# Patient Record
Sex: MALE | Race: OTHER | HISPANIC OR LATINO | URBAN - METROPOLITAN AREA
[De-identification: names, ages, dates, MRNs, and addresses within clinical notes are randomized per-mention and may not be internally consistent; named-entity substitution may affect disease eponyms.]

---

## 2024-03-22 ENCOUNTER — INPATIENT (INPATIENT)
Facility: HOSPITAL | Age: 49
LOS: 3 days | Discharge: HOME | DRG: 603 | End: 2024-03-26
Attending: INTERNAL MEDICINE | Admitting: INTERNAL MEDICINE
Payer: COMMERCIAL

## 2024-03-22 VITALS
DIASTOLIC BLOOD PRESSURE: 77 MMHG | HEIGHT: 70 IN | HEART RATE: 82 BPM | TEMPERATURE: 98 F | OXYGEN SATURATION: 96 % | SYSTOLIC BLOOD PRESSURE: 122 MMHG | RESPIRATION RATE: 15 BRPM | WEIGHT: 199.96 LBS

## 2024-03-22 DIAGNOSIS — L03.115 CELLULITIS OF RIGHT LOWER LIMB: ICD-10-CM

## 2024-03-22 DIAGNOSIS — K58.9 IRRITABLE BOWEL SYNDROME WITHOUT DIARRHEA: ICD-10-CM

## 2024-03-22 DIAGNOSIS — Z29.9 ENCOUNTER FOR PROPHYLACTIC MEASURES, UNSPECIFIED: ICD-10-CM

## 2024-03-22 DIAGNOSIS — L03.119 CELLULITIS OF UNSPECIFIED PART OF LIMB: ICD-10-CM

## 2024-03-22 DIAGNOSIS — F17.200 NICOTINE DEPENDENCE, UNSPECIFIED, UNCOMPLICATED: ICD-10-CM

## 2024-03-22 DIAGNOSIS — L03.90 CELLULITIS, UNSPECIFIED: ICD-10-CM

## 2024-03-22 LAB
ALBUMIN SERPL ELPH-MCNC: 3.9 G/DL — SIGNIFICANT CHANGE UP (ref 3.3–5)
ALP SERPL-CCNC: 68 U/L — SIGNIFICANT CHANGE UP (ref 30–120)
ALT FLD-CCNC: 33 U/L — SIGNIFICANT CHANGE UP (ref 10–60)
ANION GAP SERPL CALC-SCNC: 10 MMOL/L — SIGNIFICANT CHANGE UP (ref 5–17)
AST SERPL-CCNC: 24 U/L — SIGNIFICANT CHANGE UP (ref 10–40)
BASOPHILS # BLD AUTO: 0.1 K/UL — SIGNIFICANT CHANGE UP (ref 0–0.2)
BASOPHILS NFR BLD AUTO: 0.5 % — SIGNIFICANT CHANGE UP (ref 0–2)
BILIRUB SERPL-MCNC: 0.4 MG/DL — SIGNIFICANT CHANGE UP (ref 0.2–1.2)
BUN SERPL-MCNC: 15 MG/DL — SIGNIFICANT CHANGE UP (ref 7–23)
CALCIUM SERPL-MCNC: 9.7 MG/DL — SIGNIFICANT CHANGE UP (ref 8.4–10.5)
CHLORIDE SERPL-SCNC: 102 MMOL/L — SIGNIFICANT CHANGE UP (ref 96–108)
CO2 SERPL-SCNC: 26 MMOL/L — SIGNIFICANT CHANGE UP (ref 22–31)
CREAT SERPL-MCNC: 1.18 MG/DL — SIGNIFICANT CHANGE UP (ref 0.5–1.3)
EGFR: 77 ML/MIN/1.73M2 — SIGNIFICANT CHANGE UP
EOSINOPHIL # BLD AUTO: 0.44 K/UL — SIGNIFICANT CHANGE UP (ref 0–0.5)
EOSINOPHIL NFR BLD AUTO: 2.4 % — SIGNIFICANT CHANGE UP (ref 0–6)
ERYTHROCYTE [SEDIMENTATION RATE] IN BLOOD: 28 MM/HR — HIGH (ref 0–9)
GLUCOSE SERPL-MCNC: 113 MG/DL — HIGH (ref 70–99)
HCT VFR BLD CALC: 42.9 % — SIGNIFICANT CHANGE UP (ref 39–50)
HGB BLD-MCNC: 14.8 G/DL — SIGNIFICANT CHANGE UP (ref 13–17)
HIV 1 & 2 AB SERPL IA.RAPID: SIGNIFICANT CHANGE UP
IMM GRANULOCYTES NFR BLD AUTO: 0.8 % — SIGNIFICANT CHANGE UP (ref 0–0.9)
LACTATE SERPL-SCNC: 0.7 MMOL/L — SIGNIFICANT CHANGE UP (ref 0.7–2)
LYMPHOCYTES # BLD AUTO: 12.8 % — LOW (ref 13–44)
LYMPHOCYTES # BLD AUTO: 2.39 K/UL — SIGNIFICANT CHANGE UP (ref 1–3.3)
MCHC RBC-ENTMCNC: 31 PG — SIGNIFICANT CHANGE UP (ref 27–34)
MCHC RBC-ENTMCNC: 34.5 GM/DL — SIGNIFICANT CHANGE UP (ref 32–36)
MCV RBC AUTO: 89.9 FL — SIGNIFICANT CHANGE UP (ref 80–100)
MONOCYTES # BLD AUTO: 1.24 K/UL — HIGH (ref 0–0.9)
MONOCYTES NFR BLD AUTO: 6.7 % — SIGNIFICANT CHANGE UP (ref 2–14)
NEUTROPHILS # BLD AUTO: 14.31 K/UL — HIGH (ref 1.8–7.4)
NEUTROPHILS NFR BLD AUTO: 76.8 % — SIGNIFICANT CHANGE UP (ref 43–77)
NRBC # BLD: 0 /100 WBCS — SIGNIFICANT CHANGE UP (ref 0–0)
PLATELET # BLD AUTO: 430 K/UL — HIGH (ref 150–400)
POTASSIUM SERPL-MCNC: 4.2 MMOL/L — SIGNIFICANT CHANGE UP (ref 3.5–5.3)
POTASSIUM SERPL-SCNC: 4.2 MMOL/L — SIGNIFICANT CHANGE UP (ref 3.5–5.3)
PROT SERPL-MCNC: 8 G/DL — SIGNIFICANT CHANGE UP (ref 6–8.3)
RBC # BLD: 4.77 M/UL — SIGNIFICANT CHANGE UP (ref 4.2–5.8)
RBC # FLD: 12.6 % — SIGNIFICANT CHANGE UP (ref 10.3–14.5)
SODIUM SERPL-SCNC: 138 MMOL/L — SIGNIFICANT CHANGE UP (ref 135–145)
WBC # BLD: 18.62 K/UL — HIGH (ref 3.8–10.5)
WBC # FLD AUTO: 18.62 K/UL — HIGH (ref 3.8–10.5)

## 2024-03-22 PROCEDURE — 99285 EMERGENCY DEPT VISIT HI MDM: CPT

## 2024-03-22 PROCEDURE — 93010 ELECTROCARDIOGRAM REPORT: CPT

## 2024-03-22 PROCEDURE — 71045 X-RAY EXAM CHEST 1 VIEW: CPT | Mod: 26

## 2024-03-22 RX ORDER — LANOLIN ALCOHOL/MO/W.PET/CERES
3 CREAM (GRAM) TOPICAL AT BEDTIME
Refills: 0 | Status: DISCONTINUED | OUTPATIENT
Start: 2024-03-22 | End: 2024-03-26

## 2024-03-22 RX ORDER — DICLOFENAC SODIUM 75 MG/1
1 TABLET, DELAYED RELEASE ORAL
Refills: 0 | DISCHARGE

## 2024-03-22 RX ORDER — PIPERACILLIN AND TAZOBACTAM 4; .5 G/20ML; G/20ML
3.38 INJECTION, POWDER, LYOPHILIZED, FOR SOLUTION INTRAVENOUS ONCE
Refills: 0 | Status: COMPLETED | OUTPATIENT
Start: 2024-03-22 | End: 2024-03-22

## 2024-03-22 RX ORDER — ONDANSETRON 8 MG/1
4 TABLET, FILM COATED ORAL EVERY 8 HOURS
Refills: 0 | Status: DISCONTINUED | OUTPATIENT
Start: 2024-03-22 | End: 2024-03-26

## 2024-03-22 RX ORDER — ACETAMINOPHEN 500 MG
650 TABLET ORAL EVERY 6 HOURS
Refills: 0 | Status: DISCONTINUED | OUTPATIENT
Start: 2024-03-22 | End: 2024-03-26

## 2024-03-22 RX ORDER — DAPTOMYCIN 500 MG/10ML
500 INJECTION, POWDER, LYOPHILIZED, FOR SOLUTION INTRAVENOUS EVERY 24 HOURS
Refills: 0 | Status: DISCONTINUED | OUTPATIENT
Start: 2024-03-22 | End: 2024-03-26

## 2024-03-22 RX ORDER — PANTOPRAZOLE SODIUM 20 MG/1
40 TABLET, DELAYED RELEASE ORAL
Refills: 0 | Status: DISCONTINUED | OUTPATIENT
Start: 2024-03-22 | End: 2024-03-26

## 2024-03-22 RX ORDER — SODIUM CHLORIDE 9 MG/ML
1000 INJECTION INTRAMUSCULAR; INTRAVENOUS; SUBCUTANEOUS
Refills: 0 | Status: DISCONTINUED | OUTPATIENT
Start: 2024-03-22 | End: 2024-03-25

## 2024-03-22 RX ORDER — CEFTRIAXONE 500 MG/1
2000 INJECTION, POWDER, FOR SOLUTION INTRAMUSCULAR; INTRAVENOUS EVERY 24 HOURS
Refills: 0 | Status: DISCONTINUED | OUTPATIENT
Start: 2024-03-22 | End: 2024-03-25

## 2024-03-22 RX ORDER — MAGNESIUM HYDROXIDE 400 MG/1
30 TABLET, CHEWABLE ORAL DAILY
Refills: 0 | Status: DISCONTINUED | OUTPATIENT
Start: 2024-03-22 | End: 2024-03-26

## 2024-03-22 RX ORDER — VANCOMYCIN HCL 1 G
1000 VIAL (EA) INTRAVENOUS ONCE
Refills: 0 | Status: COMPLETED | OUTPATIENT
Start: 2024-03-22 | End: 2024-03-22

## 2024-03-22 RX ORDER — LACTOBACILLUS ACIDOPHILUS 100MM CELL
1 CAPSULE ORAL EVERY 12 HOURS
Refills: 0 | Status: DISCONTINUED | OUTPATIENT
Start: 2024-03-22 | End: 2024-03-26

## 2024-03-22 RX ORDER — ENOXAPARIN SODIUM 100 MG/ML
40 INJECTION SUBCUTANEOUS EVERY 24 HOURS
Refills: 0 | Status: DISCONTINUED | OUTPATIENT
Start: 2024-03-23 | End: 2024-03-26

## 2024-03-22 RX ORDER — TRAMADOL HYDROCHLORIDE 50 MG/1
50 TABLET ORAL THREE TIMES A DAY
Refills: 0 | Status: DISCONTINUED | OUTPATIENT
Start: 2024-03-22 | End: 2024-03-26

## 2024-03-22 RX ORDER — PIPERACILLIN AND TAZOBACTAM 4; .5 G/20ML; G/20ML
3.38 INJECTION, POWDER, LYOPHILIZED, FOR SOLUTION INTRAVENOUS EVERY 8 HOURS
Refills: 0 | Status: DISCONTINUED | OUTPATIENT
Start: 2024-03-22 | End: 2024-03-22

## 2024-03-22 RX ORDER — SODIUM CHLORIDE 9 MG/ML
1000 INJECTION INTRAMUSCULAR; INTRAVENOUS; SUBCUTANEOUS ONCE
Refills: 0 | Status: COMPLETED | OUTPATIENT
Start: 2024-03-22 | End: 2024-03-22

## 2024-03-22 RX ORDER — DICLOFENAC SODIUM 75 MG/1
75 TABLET, DELAYED RELEASE ORAL
Refills: 0 | Status: DISCONTINUED | OUTPATIENT
Start: 2024-03-22 | End: 2024-03-26

## 2024-03-22 RX ADMIN — SODIUM CHLORIDE 1000 MILLILITER(S): 9 INJECTION INTRAMUSCULAR; INTRAVENOUS; SUBCUTANEOUS at 14:17

## 2024-03-22 RX ADMIN — PIPERACILLIN AND TAZOBACTAM 3.38 GRAM(S): 4; .5 INJECTION, POWDER, LYOPHILIZED, FOR SOLUTION INTRAVENOUS at 13:47

## 2024-03-22 RX ADMIN — DAPTOMYCIN 120 MILLIGRAM(S): 500 INJECTION, POWDER, LYOPHILIZED, FOR SOLUTION INTRAVENOUS at 19:48

## 2024-03-22 RX ADMIN — TRAMADOL HYDROCHLORIDE 50 MILLIGRAM(S): 50 TABLET ORAL at 23:41

## 2024-03-22 RX ADMIN — SODIUM CHLORIDE 1000 MILLILITER(S): 9 INJECTION INTRAMUSCULAR; INTRAVENOUS; SUBCUTANEOUS at 13:17

## 2024-03-22 RX ADMIN — SODIUM CHLORIDE 60 MILLILITER(S): 9 INJECTION INTRAMUSCULAR; INTRAVENOUS; SUBCUTANEOUS at 18:44

## 2024-03-22 RX ADMIN — CEFTRIAXONE 100 MILLIGRAM(S): 500 INJECTION, POWDER, FOR SOLUTION INTRAMUSCULAR; INTRAVENOUS at 18:50

## 2024-03-22 RX ADMIN — Medication 1 TABLET(S): at 17:30

## 2024-03-22 RX ADMIN — Medication 3 MILLIGRAM(S): at 23:05

## 2024-03-22 RX ADMIN — TRAMADOL HYDROCHLORIDE 50 MILLIGRAM(S): 50 TABLET ORAL at 23:05

## 2024-03-22 RX ADMIN — PIPERACILLIN AND TAZOBACTAM 200 GRAM(S): 4; .5 INJECTION, POWDER, LYOPHILIZED, FOR SOLUTION INTRAVENOUS at 13:17

## 2024-03-22 RX ADMIN — Medication 250 MILLIGRAM(S): at 14:26

## 2024-03-22 NOTE — CONSULT NOTE ADULT - SUBJECTIVE AND OBJECTIVE BOX
HPI:  47YO M no significant past medical history who presented to the hospital with c/o bilateral LE redness pain and swelling  x 10 days.  Patient states that he has history of folliculitis. Was seen at urgent care on 8/18 and started on Bactrim without improvement. Denies fever, chills, nausea, vomiting, cp, sob or other symptoms. In ED noted to have Primo LE abscess with cellulitis. Sp I&D in ED.    Infectious Disease consult was called to evaluate pt and for antibiotic management.      Past Medical & Surgical Hx:  PAST MEDICAL & SURGICAL HISTORY:  IBS (irritable bowel syndrome)  Nicotine use disorder    Social History--  EtOH: denies   Tobacco: denies  Drug Use: denies      FAMILY HISTORY:  Noncontributory    Allergies  No Known Allergies    Intolerances  NONE      Home Medications:      Current Inpatient Medications :    ANTIBIOTICS:   piperacillin/tazobactam IVPB.. 3.375 Gram(s) IV Intermittent every 8 hours      OTHER RELEVANT MEDICATIONS :  acetaminophen     Tablet .. 650 milliGRAM(s) Oral every 6 hours PRN  aluminum hydroxide/magnesium hydroxide/simethicone Suspension 30 milliLiter(s) Oral every 4 hours PRN  magnesium hydroxide Suspension 30 milliLiter(s) Oral daily PRN  melatonin 3 milliGRAM(s) Oral at bedtime PRN  ondansetron Injectable 4 milliGRAM(s) IV Push every 8 hours PRN  pantoprazole    Tablet 40 milliGRAM(s) Oral before breakfast  sodium chloride 0.9%. 1000 milliLiter(s) IV Continuous <Continuous>  traMADol 50 milliGRAM(s) Oral three times a day PRN        ROS:  CONSTITUTIONAL:  Negative fever or chills  EYES:  Negative  blurry vision or double vision  CARDIOVASCULAR:  Negative for chest pain or palpitations  RESPIRATORY:  Negative for cough, wheezing, or SOB   GASTROINTESTINAL:  Negative for nausea, vomiting, diarrhea, constipation, or abdominal pain  GENITOURINARY:  Negative frequency, urgency , dysuria or hematuria   NEUROLOGIC:  No headache, confusion, dizziness, lightheadedness  All other systems were reviewed and are negative      Physical Exam:  Vital Signs Last 24 Hrs  T(C): 36.8 (22 Mar 2024 12:29), Max: 36.8 (22 Mar 2024 12:29)  T(F): 98.3 (22 Mar 2024 12:29), Max: 98.3 (22 Mar 2024 12:29)  HR: 82 (22 Mar 2024 12:29) (82 - 82)  BP: 122/77 (22 Mar 2024 12:29) (122/77 - 122/77)  RR: 15 (22 Mar 2024 12:29) (15 - 15)  SpO2: 96% (22 Mar 2024 12:29) (96% - 96%)    Parameters below as of 22 Mar 2024 12:29  Patient On (Oxygen Delivery Method): room air      Height (cm): 177.8 (03-22 @ 12:29)  Weight (kg): 90.7 (03-22 @ 12:29)  BMI (kg/m2): 28.7 (03-22 @ 12:29)  BSA (m2): 2.09 (03-22 @ 12:29)    General: well developed well nourished, in no acute distress  Neck: supple, trachea midline  Lungs: clear, no wheeze/rhonchi  Cardiovascular: regular rate and rhythm, S1 S2  Abdomen: soft, nontender, ND, bowel sounds normal  Neurological:  alert and oriented x3  Skin: no rash  Extremities: Primo LE erythema with induration +tender to touch      Labs:                      14.8   18.62 )-----------( 430      ( 22 Mar 2024 13:20 )             42.9   03-22    138  |  102  |  15  ----------------------------<  113<H>  4.2   |  26  |  1.18    Ca    9.7      22 Mar 2024 13:20    TPro  8.0  /  Alb  3.9  /  TBili  0.4  /  DBili  x   /  AST  24  /  ALT  33  /  AlkPhos  68  03-22      RECENT CULTURES:          RADIOLOGY & ADDITIONAL STUDIES:    Assessment :   47YO M no significant past medical history who presented to the hospital with c/o bilateral LE redness pain and swelling  x 10 days.  Patient states that he has history of folliculitis. Was seen at urgent care on 8/18 and started on Bactrim without improvement. Denies fever, chills, nausea, vomiting, cp, sob or other symptoms. In ED noted to have Primo LE abscess with cellulitis. wbc 18K  Sp I&D in ED. unfortunately no cultures sent    Plan :   Change to Dapto and Rocephin  Get wound cultures  Fu cultures  MRSA screen  Elevate legs    Continue with present regiment .  Approptiate use of antibiotics and adverse effects reviewed.      I have discussed the above plan of care with patient in detail. He expressed understanding of the treatment plan . Risks, benefits and alternatives discussed in detail. I have asked if he has any questions or concerns and appropriately addressed them to the best of my ability .      > 45 minutes spent in direct patient care reviewing  the notes, lab data/ imaging , discussion with multidisciplinary team. All questions were addressed and answered to the best of my capacity .    Thank you for allowing me to participate in the care of your patient .      Dusty Hammer MD  Infectious Disease  [phone]
47 yo male c/o bilat lower extremities cellulitis x last 10 days, pain , swelling  after possible mosquito bites, while in [place]. Patient started on bactrim 4 days ago without any improvement. Pain 8/10, not relived by anything.    "date of service"03-22-24 @ 16:24    HPI:      PAST MEDICAL & SURGICAL HISTORY:      03-22-24 @ 16:24  REVIEW OF SYSTEMS:    CONSTITUTIONAL: No weakness, fevers or chills  EYES/ENT: No visual changes;  No vertigo or throat pain   NECK: No pain or stiffness  RESPIRATORY: No cough, wheezing, hemoptysis; No shortness of breath  CARDIOVASCULAR: No chest pain or palpitations  GASTROINTESTINAL: No abdominal or epigastric pain. No nausea, vomiting, or hematemesis; No diarrhea or constipation. No melena or hematochezia.  GENITOURINARY: No dysuria, frequency or hematuria  NEUROLOGICAL: No numbness or weakness  SKIN: No itching, burning, rashes, or lesions   All other review of systems is negative unless indicated above.    MEDICATIONS  (STANDING):    MEDICATIONS  (PRN):      Allergies    No Known Allergies    Intolerances        SOCIAL HISTORY: non smoker    FAMILY HISTORY:non contributory      Vital Signs Last 24 Hrs  T(C): 36.8 (22 Mar 2024 12:29), Max: 36.8 (22 Mar 2024 12:29)  T(F): 98.3 (22 Mar 2024 12:29), Max: 98.3 (22 Mar 2024 12:29)  HR: 82 (22 Mar 2024 12:29) (82 - 82)  BP: 122/77 (22 Mar 2024 12:29) (122/77 - 122/77)  BP(mean): --  RR: 15 (22 Mar 2024 12:29) (15 - 15)  SpO2: 96% (22 Mar 2024 12:29) (96% - 96%)    Parameters below as of 22 Mar 2024 12:29  Patient On (Oxygen Delivery Method): room air        .03-22-24 @ 16:24  VITAL SIGNS:  T(C): 36.8 (03-22-24 @ 12:29), Max: 36.8 (03-22-24 @ 12:29)  T(F): 98.3 (03-22-24 @ 12:29), Max: 98.3 (03-22-24 @ 12:29)  HR: 82 (03-22-24 @ 12:29) (82 - 82)  BP: 122/77 (03-22-24 @ 12:29) (122/77 - 122/77)  BP(mean): --  RR: 15 (03-22-24 @ 12:29) (15 - 15)  SpO2: 96% (03-22-24 @ 12:29) (96% - 96%)  Wt(kg): --    PHYSICAL EXAM:    Constitutional: resting comfortably in bed; NAD  Eyes: PERRL, EOMI, anicteric sclera  ENT: no nasal discharge; uvula midline, no oropharyngeal erythema or exudates  Neck: supple; no JVD or thyromegaly  Respiratory: CTA B/L; no W/R/R, no retractions  Cardiac: +S1/S2; RRR; no murmurs  Gastrointestinal: soft, NT/ND; no rebound or guarding; +BSx4  Back: spine midline, no bony tenderness or step-offs; no CVAT B/L  Extremities: Bilat LE cellulitis with fluctuance seen in left lowe4 leg, proximal third, lateral aspect 3cm fluctuance with 10 cm cellulitis, RT lower ext 2 cm fluctuance medial leg, few other possible bites? allergic reaction?   Musculoskeletal: NROM x4; no joint swelling, tenderness or erythema  Vascular: 2+ radial, femoral, DP/PT pulses B/L  Dermatologic: skin warm, dry and intact; no rashes, wounds, or scars  Lymphatic: no submandibular or cervical LAD  Neurologic: AAOx3; CNII-XII grossly intact; no focal deficits  Psychiatric: affect and characteristics of appearance, verbalizations, behaviors are appropriate    LABS:                        14.8   18.62 )-----------( 430      ( 22 Mar 2024 13:20 )             42.9       03-22    138  |  102  |  15  ----------------------------<  113<H>  4.2   |  26  |  1.18    Ca    9.7      22 Mar 2024 13:20    TPro  8.0  /  Alb  3.9  /  TBili  0.4  /  DBili  x   /  AST  24  /  ALT  33  /  AlkPhos  68  03-22          Urinalysis Basic - ( 22 Mar 2024 13:20 )    Color: x / Appearance: x / SG: x / pH: x  Gluc: 113 mg/dL / Ketone: x  / Bili: x / Urobili: x   Blood: x / Protein: x / Nitrite: x   Leuk Esterase: x / RBC: x / WBC x   Sq Epi: x / Non Sq Epi: x / Bacteria: x        RADIOLOGY & ADDITIONAL STUDIES:

## 2024-03-22 NOTE — ED PROVIDER NOTE - SKIN, MLM
+erythema with increased warmth and mild swelling noted to B anterior lower legs with abscess noted to right medial and left lateral lower legs

## 2024-03-22 NOTE — H&P ADULT - ASSESSMENT
48-year-old male with no significant past medical history presents with complaint of redness to bilateral lower legs x 10 days.  Patient states that he has history of folliculitis and noticed rash/?insect bites while in [place] to his bilateral lower legs 10 days ago however states that his symptoms have been getting worse with surrounding redness and occasional weeping.  States that he was seen at urgent care on 8/18 and started on Bactrim without improvement.  States that he returned to urgent care today and was sent to ER for IV antibiotics.  Denies fever, chills, nausea, vomiting, cp, sob or other symptoms.  pcp: Dr. Harrison Scott

## 2024-03-22 NOTE — ED PROVIDER NOTE - OBJECTIVE STATEMENT
48-year-old male with no significant past medical history presents with complaint of redness to bilateral lower legs x 10 days.  Patient states that he has history of folliculitis and noticed rash to his bilateral lower legs 10 days ago however states that has been getting worse with surrounding redness and occasional weeping.  States that he was seen at urgent care on 8/18 and started on Bactrim without improvement.  States that he returned to urgent care today and was sent to ER for IV antibiotics.  Denies fever, chills, nausea, vomiting, cp, sob or other symptoms. 48-year-old male with no significant past medical history presents with complaint of redness to bilateral lower legs x 10 days.  Patient states that he has history of folliculitis and noticed rash/?insect bites while in [place] to his bilateral lower legs 10 days ago however states that his symptoms have been getting worse with surrounding redness and occasional weeping.  States that he was seen at urgent care on 8/18 and started on Bactrim without improvement.  States that he returned to urgent care today and was sent to ER for IV antibiotics.  Denies fever, chills, nausea, vomiting, cp, sob or other symptoms.  pcp: Dr. Harrison Scott

## 2024-03-22 NOTE — H&P ADULT - SKIN COMMENTS
Bilat LE cellulitis with fluctuance seen in left lowe4 leg, proximal third, lateral aspect 3cm fluctuance with 10 cm cellulitis, RT lower ext 2 cm fluctuance medial leg, few other possible bites? allergic reaction?

## 2024-03-22 NOTE — ED PROVIDER NOTE - CARE PLAN
Principal Discharge DX:	Bilateral cellulitis of lower leg  Secondary Diagnosis:	Abscess of left lower leg   1

## 2024-03-22 NOTE — ED ADULT NURSE NOTE - OBJECTIVE STATEMENT
Pt is alert and oriented. Pt states that he has had redness and swelling in his BLE for 10 days. Pt states that he was put on abx on Monday with no relief. Pt has redness and swelling to his ble. Pt has a large abscess to the left outer calf. Pt denies fevers, sob, chest pain, nausea, vomiting, and dizziness. Pt resp are even and unlabored, skin color rebekah for race. Pt updated on plan of care.

## 2024-03-22 NOTE — ED PROVIDER NOTE - MUSCULOSKELETAL, MLM
+erythema with increased warmth and mild swelling noted to B anterior lower legs with induration noted to right medial and left lateral lower legs, no fluctuance or drainage noted, toes warm & mobile, distal pulses and sensation intact, FROM BLE, calf B NT, NVI

## 2024-03-22 NOTE — CONSULT NOTE ADULT - ASSESSMENT
47 yo male with bilat LE cellulitis/abscess  -Will drain abscess at bedside  -It looks like a severe allergic reaction? to mosquitoe bites? denies poison ivy contact  -Keep leg elevated  -IV Abx

## 2024-03-22 NOTE — ED PROVIDER NOTE - CLINICAL SUMMARY MEDICAL DECISION MAKING FREE TEXT BOX
48-year-old male with no significant past medical history presents with complaint of redness to bilateral lower legs x 10 days.  Patient states that he has history of folliculitis and noticed rash to his bilateral lower legs 10 days ago however states that has been getting worse with surrounding redness and occasional weeping.  States that he was seen at urgent care on 8/18 and started on Bactrim without improvement.  States that he returned to urgent care today and was sent to ER for IV antibiotics.  Denies fever, chills, nausea, vomiting, cp, sob or other symptoms.    VSS Afebrile, NAD  HEENT - clear  PERRL EOMI  Neck supple  lungs clear  Cor S1S2 RR - MGR  Abd soft nontender, no mass or HSM, no rebound  Ext FROM intact, no edema  Neuro Intact, no deficits.  Skin +erythema with increased warmth and mild swelling noted to B anterior lower legs with abscess noted to right medial and left lateral lower legs    Imp- Cellulitis  Plan - labs, IV antibiotics, admit. 48-year-old male with no significant past medical history presents with complaint of redness to bilateral lower legs x 10 days.  Patient states that he has history of folliculitis and noticed rash to his bilateral lower legs 10 days ago however states that has been getting worse with surrounding redness and occasional weeping.  States that he was seen at urgent care on 8/18 and started on Bactrim without improvement.  States that he returned to urgent care today and was sent to ER for IV antibiotics.  Denies fever, chills, nausea, vomiting, cp, sob or other symptoms.    VSS Afebrile, NAD  HEENT - clear  PERRL EOMI  Neck supple  lungs clear  Cor S1S2 RR - MGR  Abd soft nontender, no mass or HSM, no rebound  Ext FROM intact  Neuro Intact, no deficits.  Skin +erythema with increased warmth and mild swelling noted to B anterior lower legs with abscess noted to right medial and left lateral lower legs    Imp- Cellulitis  Plan - labs, IV antibiotics, admit.

## 2024-03-22 NOTE — ED ADULT TRIAGE NOTE - CHIEF COMPLAINT QUOTE
49 y/o male presents axo4 ambulatory referred to the ED from urgent care for bilateral lower extremity cellulitis eval/treatment. pt reports hx folliculitis and has been on PO antibiotics over the past week with no relief.

## 2024-03-22 NOTE — H&P ADULT - PROBLEM SELECTOR PLAN 1
likely  2/2 to  severe allergic reaction to mosquitoes bites ,denies poison ivy contact  ,seen by ID CONS - start  Dapto and Rocephin , s/p debridement  ,surgery is following   Get wound cultures  Fu cultures  MRSA screen  Elevate legs , topical care , daily shower and dry dressing

## 2024-03-22 NOTE — H&P ADULT - TIME BILLING
55minutes spent on this visit, 50% visit time spent in care co-ordination with other attendings and counselling patient ,writing admission orders ( see complete and current orders and order section) ,requesting necessary consults ,informing family about status & plan of care .I have discussed care plan with [facility] wellness/admitting /nursing   department ,outpatient PCP , hospital consultants , ER physician & med staff .

## 2024-03-23 LAB
ALBUMIN SERPL ELPH-MCNC: 3.2 G/DL — LOW (ref 3.3–5)
ALP SERPL-CCNC: 61 U/L — SIGNIFICANT CHANGE UP (ref 30–120)
ALT FLD-CCNC: 27 U/L — SIGNIFICANT CHANGE UP (ref 10–60)
ANION GAP SERPL CALC-SCNC: 8 MMOL/L — SIGNIFICANT CHANGE UP (ref 5–17)
AST SERPL-CCNC: 32 U/L — SIGNIFICANT CHANGE UP (ref 10–40)
BASOPHILS # BLD AUTO: 0.12 K/UL — SIGNIFICANT CHANGE UP (ref 0–0.2)
BASOPHILS NFR BLD AUTO: 0.8 % — SIGNIFICANT CHANGE UP (ref 0–2)
BILIRUB SERPL-MCNC: 0.6 MG/DL — SIGNIFICANT CHANGE UP (ref 0.2–1.2)
BUN SERPL-MCNC: 11 MG/DL — SIGNIFICANT CHANGE UP (ref 7–23)
CALCIUM SERPL-MCNC: 8.9 MG/DL — SIGNIFICANT CHANGE UP (ref 8.4–10.5)
CHLORIDE SERPL-SCNC: 107 MMOL/L — SIGNIFICANT CHANGE UP (ref 96–108)
CO2 SERPL-SCNC: 25 MMOL/L — SIGNIFICANT CHANGE UP (ref 22–31)
CREAT SERPL-MCNC: 0.91 MG/DL — SIGNIFICANT CHANGE UP (ref 0.5–1.3)
EGFR: 105 ML/MIN/1.73M2 — SIGNIFICANT CHANGE UP
EOSINOPHIL # BLD AUTO: 0.87 K/UL — HIGH (ref 0–0.5)
EOSINOPHIL NFR BLD AUTO: 5.6 % — SIGNIFICANT CHANGE UP (ref 0–6)
GLUCOSE SERPL-MCNC: 117 MG/DL — HIGH (ref 70–99)
HCT VFR BLD CALC: 41.7 % — SIGNIFICANT CHANGE UP (ref 39–50)
HGB BLD-MCNC: 13.8 G/DL — SIGNIFICANT CHANGE UP (ref 13–17)
IMM GRANULOCYTES NFR BLD AUTO: 0.8 % — SIGNIFICANT CHANGE UP (ref 0–0.9)
INR BLD: 1.07 RATIO — SIGNIFICANT CHANGE UP (ref 0.85–1.18)
LYMPHOCYTES # BLD AUTO: 21.9 % — SIGNIFICANT CHANGE UP (ref 13–44)
LYMPHOCYTES # BLD AUTO: 3.4 K/UL — HIGH (ref 1–3.3)
MCHC RBC-ENTMCNC: 30.4 PG — SIGNIFICANT CHANGE UP (ref 27–34)
MCHC RBC-ENTMCNC: 33.1 GM/DL — SIGNIFICANT CHANGE UP (ref 32–36)
MCV RBC AUTO: 91.9 FL — SIGNIFICANT CHANGE UP (ref 80–100)
MONOCYTES # BLD AUTO: 1.47 K/UL — HIGH (ref 0–0.9)
MONOCYTES NFR BLD AUTO: 9.5 % — SIGNIFICANT CHANGE UP (ref 2–14)
MRSA PCR RESULT.: SIGNIFICANT CHANGE UP
NEUTROPHILS # BLD AUTO: 9.52 K/UL — HIGH (ref 1.8–7.4)
NEUTROPHILS NFR BLD AUTO: 61.4 % — SIGNIFICANT CHANGE UP (ref 43–77)
NRBC # BLD: 0 /100 WBCS — SIGNIFICANT CHANGE UP (ref 0–0)
PLATELET # BLD AUTO: 399 K/UL — SIGNIFICANT CHANGE UP (ref 150–400)
POTASSIUM SERPL-MCNC: 4.9 MMOL/L — SIGNIFICANT CHANGE UP (ref 3.5–5.3)
POTASSIUM SERPL-SCNC: 4.9 MMOL/L — SIGNIFICANT CHANGE UP (ref 3.5–5.3)
PROT SERPL-MCNC: 7 G/DL — SIGNIFICANT CHANGE UP (ref 6–8.3)
PROTHROM AB SERPL-ACNC: 11.6 SEC — SIGNIFICANT CHANGE UP (ref 9.5–13)
RBC # BLD: 4.54 M/UL — SIGNIFICANT CHANGE UP (ref 4.2–5.8)
RBC # FLD: 12.8 % — SIGNIFICANT CHANGE UP (ref 10.3–14.5)
S AUREUS DNA NOSE QL NAA+PROBE: SIGNIFICANT CHANGE UP
SODIUM SERPL-SCNC: 140 MMOL/L — SIGNIFICANT CHANGE UP (ref 135–145)
WBC # BLD: 15.5 K/UL — HIGH (ref 3.8–10.5)
WBC # FLD AUTO: 15.5 K/UL — HIGH (ref 3.8–10.5)

## 2024-03-23 RX ADMIN — DICLOFENAC SODIUM 75 MILLIGRAM(S): 75 TABLET, DELAYED RELEASE ORAL at 06:15

## 2024-03-23 RX ADMIN — ENOXAPARIN SODIUM 40 MILLIGRAM(S): 100 INJECTION SUBCUTANEOUS at 08:21

## 2024-03-23 RX ADMIN — TRAMADOL HYDROCHLORIDE 50 MILLIGRAM(S): 50 TABLET ORAL at 08:21

## 2024-03-23 RX ADMIN — PANTOPRAZOLE SODIUM 40 MILLIGRAM(S): 20 TABLET, DELAYED RELEASE ORAL at 06:15

## 2024-03-23 RX ADMIN — Medication 1 TABLET(S): at 17:02

## 2024-03-23 RX ADMIN — Medication 650 MILLIGRAM(S): at 20:25

## 2024-03-23 RX ADMIN — CEFTRIAXONE 100 MILLIGRAM(S): 500 INJECTION, POWDER, FOR SOLUTION INTRAMUSCULAR; INTRAVENOUS at 17:01

## 2024-03-23 RX ADMIN — Medication 650 MILLIGRAM(S): at 12:51

## 2024-03-23 RX ADMIN — SODIUM CHLORIDE 60 MILLILITER(S): 9 INJECTION INTRAMUSCULAR; INTRAVENOUS; SUBCUTANEOUS at 08:21

## 2024-03-23 RX ADMIN — DAPTOMYCIN 120 MILLIGRAM(S): 500 INJECTION, POWDER, LYOPHILIZED, FOR SOLUTION INTRAVENOUS at 18:21

## 2024-03-23 RX ADMIN — Medication 650 MILLIGRAM(S): at 13:20

## 2024-03-23 RX ADMIN — Medication 3 MILLIGRAM(S): at 20:25

## 2024-03-23 RX ADMIN — DICLOFENAC SODIUM 75 MILLIGRAM(S): 75 TABLET, DELAYED RELEASE ORAL at 06:45

## 2024-03-23 RX ADMIN — Medication 650 MILLIGRAM(S): at 20:55

## 2024-03-23 RX ADMIN — DICLOFENAC SODIUM 75 MILLIGRAM(S): 75 TABLET, DELAYED RELEASE ORAL at 17:01

## 2024-03-23 RX ADMIN — TRAMADOL HYDROCHLORIDE 50 MILLIGRAM(S): 50 TABLET ORAL at 17:01

## 2024-03-23 RX ADMIN — TRAMADOL HYDROCHLORIDE 50 MILLIGRAM(S): 50 TABLET ORAL at 17:30

## 2024-03-23 RX ADMIN — Medication 1 TABLET(S): at 06:15

## 2024-03-23 RX ADMIN — TRAMADOL HYDROCHLORIDE 50 MILLIGRAM(S): 50 TABLET ORAL at 08:50

## 2024-03-23 RX ADMIN — DICLOFENAC SODIUM 75 MILLIGRAM(S): 75 TABLET, DELAYED RELEASE ORAL at 17:30

## 2024-03-23 NOTE — PROGRESS NOTE ADULT - SUBJECTIVE AND OBJECTIVE BOX
CHIEF COMPLAINT/ REASON FOR VISIT  .. Patient was seen to address the  issue listed under PROBLEM LIST which is located toward bottom of this note     ELLIOT DAWSON    SY 1EST [address]    Allergies    No Known Allergies    Intolerances        PAST MEDICAL & SURGICAL HISTORY:  IBS (irritable bowel syndrome)      Nicotine use disorder          FAMILY HISTORY:      Home Medications:  diclofenac sodium 75 mg oral delayed release tablet: 1 tab(s) orally 2 times a day (22 Mar 2024 19:01)      MEDICATIONS  (STANDING):  cefTRIAXone   IVPB 2000 milliGRAM(s) IV Intermittent every 24 hours  DAPTOmycin IVPB 500 milliGRAM(s) IV Intermittent every 24 hours  diclofenac 75 milliGRAM(s) Oral two times a day  enoxaparin Injectable 40 milliGRAM(s) SubCutaneous every 24 hours  lactobacillus acidophilus 1 Tablet(s) Oral every 12 hours  pantoprazole    Tablet 40 milliGRAM(s) Oral before breakfast  sodium chloride 0.9%. 1000 milliLiter(s) (60 mL/Hr) IV Continuous <Continuous>    MEDICATIONS  (PRN):  acetaminophen     Tablet .. 650 milliGRAM(s) Oral every 6 hours PRN Temp greater or equal to 38C (100.4F), Mild Pain (1 - 3)  aluminum hydroxide/magnesium hydroxide/simethicone Suspension 30 milliLiter(s) Oral every 4 hours PRN Dyspepsia  magnesium hydroxide Suspension 30 milliLiter(s) Oral daily PRN Constipation  melatonin 3 milliGRAM(s) Oral at bedtime PRN Insomnia  ondansetron Injectable 4 milliGRAM(s) IV Push every 8 hours PRN Nausea and/or Vomiting  traMADol 50 milliGRAM(s) Oral three times a day PRN Moderate Pain (4 - 6)      Diet, Regular:   Lactose Restricted (Milk Sugar Intoler.) (03-22-24 @ 16:54) [Active]          Vital Signs Last 24 Hrs  T(C): 36.7 (23 Mar 2024 05:17), Max: 36.8 (22 Mar 2024 12:29)  T(F): 98 (23 Mar 2024 05:17), Max: 98.3 (22 Mar 2024 12:29)  HR: 63 (23 Mar 2024 05:17) (58 - 82)  BP: 106/61 (23 Mar 2024 05:17) (106/61 - 124/78)  BP(mean): --  RR: 18 (23 Mar 2024 05:17) (14 - 18)  SpO2: 95% (23 Mar 2024 05:17) (95% - 96%)    Parameters below as of 23 Mar 2024 05:17  Patient On (Oxygen Delivery Method): room air                  LABS:                        13.8   15.50 )-----------( 399      ( 23 Mar 2024 06:30 )             41.7     03-23    140  |  107  |  11  ----------------------------<  117<H>  4.9   |  25  |  0.91    Ca    8.9      23 Mar 2024 06:30    TPro  7.0  /  Alb  3.2<L>  /  TBili  0.6  /  DBili  x   /  AST  32  /  ALT  27  /  AlkPhos  61  03-23    PT/INR - ( 23 Mar 2024 06:30 )   PT: 11.6 sec;   INR: 1.07 ratio           Urinalysis Basic - ( 23 Mar 2024 06:30 )    Color: x / Appearance: x / SG: x / pH: x  Gluc: 117 mg/dL / Ketone: x  / Bili: x / Urobili: x   Blood: x / Protein: x / Nitrite: x   Leuk Esterase: x / RBC: x / WBC x   Sq Epi: x / Non Sq Epi: x / Bacteria: x            WBC:  WBC Count: 15.50 K/uL (03-23 @ 06:30)  WBC Count: 18.62 K/uL (03-22 @ 13:20)      MICROBIOLOGY:  RECENT CULTURES:              PT/INR - ( 23 Mar 2024 06:30 )   PT: 11.6 sec;   INR: 1.07 ratio             Sodium:  Sodium: 140 mmol/L (03-23 @ 06:30)  Sodium: 138 mmol/L (03-22 @ 13:20)      0.91 mg/dL 03-23 @ 06:30  1.18 mg/dL 03-22 @ 13:20      Hemoglobin:  Hemoglobin: 13.8 g/dL (03-23 @ 06:30)  Hemoglobin: 14.8 g/dL (03-22 @ 13:20)      Platelets: Platelet Count - Automated: 399 K/uL (03-23 @ 06:30)  Platelet Count - Automated: 430 K/uL (03-22 @ 13:20)      LIVER FUNCTIONS - ( 23 Mar 2024 06:30 )  Alb: 3.2 g/dL / Pro: 7.0 g/dL / ALK PHOS: 61 U/L / ALT: 27 U/L / AST: 32 U/L / GGT: x             Urinalysis Basic - ( 23 Mar 2024 06:30 )    Color: x / Appearance: x / SG: x / pH: x  Gluc: 117 mg/dL / Ketone: x  / Bili: x / Urobili: x   Blood: x / Protein: x / Nitrite: x   Leuk Esterase: x / RBC: x / WBC x   Sq Epi: x / Non Sq Epi: x / Bacteria: x        RADIOLOGY & ADDITIONAL STUDIES:      MICROBIOLOGY:  RECENT CULTURES:

## 2024-03-23 NOTE — PROGRESS NOTE ADULT - SUBJECTIVE AND OBJECTIVE BOX
SAMIR ELLIOT is a 48yMale , patient examined and chart reviewed.    INTERVAL HPI/ OVERNIGHT EVENTS:   Afebrile. Still with leg pain.    PAST MEDICAL & SURGICAL HISTORY:  IBS (irritable bowel syndrome)  Nicotine use disorder      For details regarding the patient's social history, family history, and other miscellaneous elements, please refer the initial infectious diseases consultation and/or the admitting history and physical examination for this admission.    ROS:  CONSTITUTIONAL:  Negative fever or chills  EYES:  Negative  blurry vision or double vision  CARDIOVASCULAR:  Negative for chest pain or palpitations  RESPIRATORY:  Negative for cough, wheezing, or SOB   GASTROINTESTINAL:  Negative for nausea, vomiting, diarrhea, constipation, or abdominal pain  GENITOURINARY:  Negative frequency, urgency or dysuria  NEUROLOGIC:  No headache, confusion, dizziness, lightheadedness  All other systems were reviewed and are negative     No Known Allergies      Current inpatient medications :    ANTIBIOTICS/RELEVANT:  cefTRIAXone   IVPB 2000 milliGRAM(s) IV Intermittent every 24 hours  DAPTOmycin IVPB 500 milliGRAM(s) IV Intermittent every 24 hours  lactobacillus acidophilus 1 Tablet(s) Oral every 12 hours      acetaminophen     Tablet .. 650 milliGRAM(s) Oral every 6 hours PRN  aluminum hydroxide/magnesium hydroxide/simethicone Suspension 30 milliLiter(s) Oral every 4 hours PRN  diclofenac 75 milliGRAM(s) Oral two times a day  enoxaparin Injectable 40 milliGRAM(s) SubCutaneous every 24 hours  magnesium hydroxide Suspension 30 milliLiter(s) Oral daily PRN  melatonin 3 milliGRAM(s) Oral at bedtime PRN  ondansetron Injectable 4 milliGRAM(s) IV Push every 8 hours PRN  pantoprazole    Tablet 40 milliGRAM(s) Oral before breakfast  sodium chloride 0.9%. 1000 milliLiter(s) IV Continuous <Continuous>  traMADol 50 milliGRAM(s) Oral three times a day PRN      Objective:  T(C): 36.7 (03-23-24 @ 05:17), Max: 36.8 (03-22-24 @ 23:27)  HR: 63 (03-23-24 @ 05:17) (58 - 68)  BP: 106/61 (03-23-24 @ 05:17) (106/61 - 124/78)  RR: 18 (03-23-24 @ 05:17) (14 - 18)  SpO2: 95% (03-23-24 @ 05:17) (95% - 96%)      Physical Exam:  General: no acute distress  Neck: supple, trachea midline  Lungs: clear, no wheeze/rhonchi  Cardiovascular: regular rate and rhythm, S1 S2  Abdomen: soft, nontender,  bowel sounds normal  Neurological: alert and oriented x3  Skin: no rash  Extremities: Left Leg abscess area with induration  + erythema tender      LABS:                          13.8   15.50 )-----------( 399      ( 23 Mar 2024 06:30 )             41.7       03-23    140  |  107  |  11  ----------------------------<  117<H>  4.9   |  25  |  0.91    Ca    8.9      23 Mar 2024 06:30    TPro  7.0  /  Alb  3.2<L>  /  TBili  0.6  /  DBili  x   /  AST  32  /  ALT  27  /  AlkPhos  61  03-23      PT/INR - ( 23 Mar 2024 06:30 )   PT: 11.6 sec;   INR: 1.07 ratio        MICROBIOLOGY:    RADIOLOGY & ADDITIONAL STUDIES:          Assessment :  49YO M no significant past medical history who presented to the hospital with c/o bilateral LE redness pain and swelling  x 10 days.  Patient states that he has history of folliculitis. Was seen at urgent care on 8/18 and started on Bactrim without improvement. Denies fever, chills, nausea, vomiting, cp, sob or other symptoms. In ED noted to have Primo LE abscess with cellulitis. wbc 18K  Sp I&D in ED. unfortunately no cultures sent  WBC better  Cultures sent today     Plan :   Cont Dapto and Rocephin  Fu wound cultures  MRSA screen  Elevate legs  Pulm toileting   Increase activity      Continue with present regiment.  Appropriate use of antibiotics and adverse effects reviewed.      I have discussed the above plan of care with patient in detail. He expressed understanding of the  treatment plan . Risks, benefits and alternatives discussed in detail. I have asked if he has any questions or concerns and appropriately addressed them to the best of my ability .    > 35 minutes were spent in direct patient care reviewing notes, medications ,labs data/ imaging , discussion with multidisciplinary team.    Thank you for allowing me to participate in care of your patient .    Dusty Hammer MD  Infectious Disease  [phone]

## 2024-03-23 NOTE — CARE COORDINATION ASSESSMENT. - OTHER PERTINENT DISCHARGE PLANNING INFORMATION:
Pt admitted with cellulitis. Pt admitted to using cocaine occasionally, but attended an N.A meeting prior to this admission. He reported that he last used cocaine 32 days ago and intends to continue to go to N.A meetings which are near his home, and will be looking for a sponsor. In addition, pt began seeing a therapist. Pt declined further intervention.

## 2024-03-24 LAB
GRAM STN FLD: ABNORMAL
SPECIMEN SOURCE: SIGNIFICANT CHANGE UP

## 2024-03-24 RX ADMIN — Medication 3 MILLIGRAM(S): at 20:02

## 2024-03-24 RX ADMIN — DICLOFENAC SODIUM 75 MILLIGRAM(S): 75 TABLET, DELAYED RELEASE ORAL at 17:30

## 2024-03-24 RX ADMIN — Medication 1 TABLET(S): at 17:01

## 2024-03-24 RX ADMIN — SODIUM CHLORIDE 60 MILLILITER(S): 9 INJECTION INTRAMUSCULAR; INTRAVENOUS; SUBCUTANEOUS at 17:02

## 2024-03-24 RX ADMIN — DICLOFENAC SODIUM 75 MILLIGRAM(S): 75 TABLET, DELAYED RELEASE ORAL at 06:10

## 2024-03-24 RX ADMIN — DAPTOMYCIN 120 MILLIGRAM(S): 500 INJECTION, POWDER, LYOPHILIZED, FOR SOLUTION INTRAVENOUS at 18:33

## 2024-03-24 RX ADMIN — TRAMADOL HYDROCHLORIDE 50 MILLIGRAM(S): 50 TABLET ORAL at 08:27

## 2024-03-24 RX ADMIN — TRAMADOL HYDROCHLORIDE 50 MILLIGRAM(S): 50 TABLET ORAL at 08:55

## 2024-03-24 RX ADMIN — CEFTRIAXONE 100 MILLIGRAM(S): 500 INJECTION, POWDER, FOR SOLUTION INTRAMUSCULAR; INTRAVENOUS at 17:01

## 2024-03-24 RX ADMIN — ENOXAPARIN SODIUM 40 MILLIGRAM(S): 100 INJECTION SUBCUTANEOUS at 08:27

## 2024-03-24 RX ADMIN — Medication 650 MILLIGRAM(S): at 14:50

## 2024-03-24 RX ADMIN — DICLOFENAC SODIUM 75 MILLIGRAM(S): 75 TABLET, DELAYED RELEASE ORAL at 06:40

## 2024-03-24 RX ADMIN — PANTOPRAZOLE SODIUM 40 MILLIGRAM(S): 20 TABLET, DELAYED RELEASE ORAL at 06:09

## 2024-03-24 RX ADMIN — DICLOFENAC SODIUM 75 MILLIGRAM(S): 75 TABLET, DELAYED RELEASE ORAL at 17:01

## 2024-03-24 RX ADMIN — TRAMADOL HYDROCHLORIDE 50 MILLIGRAM(S): 50 TABLET ORAL at 20:02

## 2024-03-24 RX ADMIN — Medication 1 TABLET(S): at 06:09

## 2024-03-24 RX ADMIN — TRAMADOL HYDROCHLORIDE 50 MILLIGRAM(S): 50 TABLET ORAL at 20:32

## 2024-03-24 RX ADMIN — Medication 650 MILLIGRAM(S): at 14:25

## 2024-03-24 NOTE — PROGRESS NOTE ADULT - SUBJECTIVE AND OBJECTIVE BOX
SAMIR ELLIOT is a 48yMale , patient examined and chart reviewed.    INTERVAL HPI/ OVERNIGHT EVENTS:   Afebrile. Still with leg pain but feeling better.    PAST MEDICAL & SURGICAL HISTORY:  IBS (irritable bowel syndrome)  Nicotine use disorder      For details regarding the patient's social history, family history, and other miscellaneous elements, please refer the initial infectious diseases consultation and/or the admitting history and physical examination for this admission.    ROS:  CONSTITUTIONAL:  Negative fever or chills  EYES:  Negative  blurry vision or double vision  CARDIOVASCULAR:  Negative for chest pain or palpitations  RESPIRATORY:  Negative for cough, wheezing, or SOB   GASTROINTESTINAL:  Negative for nausea, vomiting, diarrhea, constipation, or abdominal pain  GENITOURINARY:  Negative frequency, urgency or dysuria  NEUROLOGIC:  No headache, confusion, dizziness, lightheadedness  All other systems were reviewed and are negative     No Known Allergies      Current inpatient medications :    ANTIBIOTICS/RELEVANT:  cefTRIAXone   IVPB 2000 milliGRAM(s) IV Intermittent every 24 hours  DAPTOmycin IVPB 500 milliGRAM(s) IV Intermittent every 24 hours    MEDICATIONS  (STANDING):  diclofenac 75 milliGRAM(s) Oral two times a day  enoxaparin Injectable 40 milliGRAM(s) SubCutaneous every 24 hours  lactobacillus acidophilus 1 Tablet(s) Oral every 12 hours  pantoprazole    Tablet 40 milliGRAM(s) Oral before breakfast  sodium chloride 0.9%. 1000 milliLiter(s) (60 mL/Hr) IV Continuous <Continuous>    MEDICATIONS  (PRN):  acetaminophen     Tablet .. 650 milliGRAM(s) Oral every 6 hours PRN Temp greater or equal to 38C (100.4F), Mild Pain (1 - 3)  aluminum hydroxide/magnesium hydroxide/simethicone Suspension 30 milliLiter(s) Oral every 4 hours PRN Dyspepsia  magnesium hydroxide Suspension 30 milliLiter(s) Oral daily PRN Constipation  melatonin 3 milliGRAM(s) Oral at bedtime PRN Insomnia  ondansetron Injectable 4 milliGRAM(s) IV Push every 8 hours PRN Nausea and/or Vomiting  traMADol 50 milliGRAM(s) Oral three times a day PRN Moderate Pain (4 - 6)        Objective:  Vital Signs Last 24 Hrs  T(C): 36.6 (24 Mar 2024 20:58), Max: 36.8 (24 Mar 2024 13:57)  T(F): 97.9 (24 Mar 2024 20:58), Max: 98.3 (24 Mar 2024 13:57)  HR: 63 (24 Mar 2024 20:58) (55 - 63)  BP: 109/58 (24 Mar 2024 20:58) (95/60 - 109/64)  RR: 16 (24 Mar 2024 20:58) (16 - 18)  SpO2: 93% (24 Mar 2024 20:58) (93% - 98%)    Parameters below as of 24 Mar 2024 20:58  Patient On (Oxygen Delivery Method): room air    Physical Exam:  General: no acute distress  Neck: supple, trachea midline  Lungs: clear, no wheeze/rhonchi  Cardiovascular: regular rate and rhythm, S1 S2  Abdomen: soft, nontender,  bowel sounds normal  Neurological: alert and oriented x3  Skin: no rash  Extremities: Left Leg abscess area with induration  + erythema tender improving      LABS:                        13.8   15.50 )-----------( 399      ( 23 Mar 2024 06:30 )             41.7   03-23    140  |  107  |  11  ----------------------------<  117<H>  4.9   |  25  |  0.91    Ca    8.9      23 Mar 2024 06:30    TPro  7.0  /  Alb  3.2<L>  /  TBili  0.6  /  DBili  x   /  AST  32  /  ALT  27  /  AlkPhos  61  03-23        MICROBIOLOGY:    Culture - Abscess with Gram Stain (collected 23 Mar 2024 12:49)  Source: .Abscess left leg abscess  Gram Stain (24 Mar 2024 00:04):    No polymorphonuclear cells seen per low power field    Rare Gram positive cocci in pairs per oil power field  Preliminary Report (24 Mar 2024 18:12):    Moderate Staphylococcus aureus    Culture - Blood (collected 22 Mar 2024 13:20)  Source: .Blood Blood  Preliminary Report (23 Mar 2024 22:01):    No growth at 24 hours    Culture - Blood (collected 22 Mar 2024 13:20)  Source: .Blood Blood  Preliminary Report (23 Mar 2024 22:01):    No growth at 24 hours      RADIOLOGY & ADDITIONAL STUDIES:          Assessment :  47YO M no significant past medical history who presented to the hospital with c/o bilateral LE redness pain and swelling  x 10 days.  Patient states that he has history of folliculitis. Was seen at urgent care on 8/18 and started on Bactrim without improvement. Denies fever, chills, nausea, vomiting, cp, sob or other symptoms. In ED noted to have Primo LE abscess with cellulitis. wbc 18K  Sp I&D in ED. unfortunately no cultures sent  WBC better  Abscess Cultures with Staph Aureus sensi pending    Plan :   Cont Dapto   Dc Rocephin  Fu abscess cultures and change to po   Elevate legs  Pulm toileting   Increase activity  Stable from ID standpoint      Continue with present regiment.  Appropriate use of antibiotics and adverse effects reviewed.      I have discussed the above plan of care with patient in detail. He expressed understanding of the  treatment plan . Risks, benefits and alternatives discussed in detail. I have asked if he has any questions or concerns and appropriately addressed them to the best of my ability .    > 35 minutes were spent in direct patient care reviewing notes, medications ,labs data/ imaging , discussion with multidisciplinary team.    Thank you for allowing me to participate in care of your patient .    Dusty Hammer MD  Infectious Disease  [phone]

## 2024-03-24 NOTE — PROGRESS NOTE ADULT - SUBJECTIVE AND OBJECTIVE BOX
CHIEF COMPLAINT/ REASON FOR VISIT  .. Patient was seen to address the  issue listed under PROBLEM LIST which is located toward bottom of this note     ELLIOT DAWSON    SY [birth year]EST [address]    Allergies    No Known Allergies    Intolerances        PAST MEDICAL & SURGICAL HISTORY:  IBS (irritable bowel syndrome)      Nicotine use disorder          FAMILY HISTORY:      Home Medications:  diclofenac sodium 75 mg oral delayed release tablet: 1 tab(s) orally 2 times a day (22 Mar 2024 19:01)      MEDICATIONS  (STANDING):  cefTRIAXone   IVPB 2000 milliGRAM(s) IV Intermittent every 24 hours  DAPTOmycin IVPB 500 milliGRAM(s) IV Intermittent every 24 hours  diclofenac 75 milliGRAM(s) Oral two times a day  enoxaparin Injectable 40 milliGRAM(s) SubCutaneous every 24 hours  lactobacillus acidophilus 1 Tablet(s) Oral every 12 hours  pantoprazole    Tablet 40 milliGRAM(s) Oral before breakfast  sodium chloride 0.9%. 1000 milliLiter(s) (60 mL/Hr) IV Continuous <Continuous>    MEDICATIONS  (PRN):  acetaminophen     Tablet .. 650 milliGRAM(s) Oral every 6 hours PRN Temp greater or equal to 38C (100.4F), Mild Pain (1 - 3)  aluminum hydroxide/magnesium hydroxide/simethicone Suspension 30 milliLiter(s) Oral every 4 hours PRN Dyspepsia  magnesium hydroxide Suspension 30 milliLiter(s) Oral daily PRN Constipation  melatonin 3 milliGRAM(s) Oral at bedtime PRN Insomnia  ondansetron Injectable 4 milliGRAM(s) IV Push every 8 hours PRN Nausea and/or Vomiting  traMADol 50 milliGRAM(s) Oral three times a day PRN Moderate Pain (4 - 6)      Diet, Regular:   Lactose Restricted (Milk Sugar Intoler.) (03-22-24 @ 16:54) [Active]          Vital Signs Last 24 Hrs  T(C): 36.7 (24 Mar 2024 05:00), Max: 36.8 (23 Mar 2024 14:45)  T(F): 98.1 (24 Mar 2024 05:00), Max: 98.3 (23 Mar 2024 14:45)  HR: 55 (24 Mar 2024 05:00) (55 - 64)  BP: 102/63 (24 Mar 2024 08:15) (95/60 - 115/66)  BP(mean): --  RR: 16 (24 Mar 2024 05:00) (16 - 18)  SpO2: 98% (24 Mar 2024 05:00) (96% - 98%)    Parameters below as of 24 Mar 2024 05:00  Patient On (Oxygen Delivery Method): room air                  LABS:                        13.8   15.50 )-----------( 399      ( 23 Mar 2024 06:30 )             41.7     03-23    140  |  107  |  11  ----------------------------<  117<H>  4.9   |  25  |  0.91    Ca    8.9      23 Mar 2024 06:30    TPro  7.0  /  Alb  3.2<L>  /  TBili  0.6  /  DBili  x   /  AST  32  /  ALT  27  /  AlkPhos  61  03-23    PT/INR - ( 23 Mar 2024 06:30 )   PT: 11.6 sec;   INR: 1.07 ratio           Urinalysis Basic - ( 23 Mar 2024 06:30 )    Color: x / Appearance: x / SG: x / pH: x  Gluc: 117 mg/dL / Ketone: x  / Bili: x / Urobili: x   Blood: x / Protein: x / Nitrite: x   Leuk Esterase: x / RBC: x / WBC x   Sq Epi: x / Non Sq Epi: x / Bacteria: x            WBC:  WBC Count: 15.50 K/uL (03-23 @ 06:30)  WBC Count: 18.62 K/uL (03-22 @ 13:20)      MICROBIOLOGY:  RECENT CULTURES:  03-23 .Abscess left leg abscess XXXX   No polymorphonuclear cells seen per low power field  Rare Gram positive cocci in pairs per oil power field XXXX    03-22 .Blood Blood XXXX XXXX   No growth at 24 hours                PT/INR - ( 23 Mar 2024 06:30 )   PT: 11.6 sec;   INR: 1.07 ratio             Sodium:  Sodium: 140 mmol/L (03-23 @ 06:30)  Sodium: 138 mmol/L (03-22 @ 13:20)      0.91 mg/dL 03-23 @ 06:30  1.18 mg/dL 03-22 @ 13:20      Hemoglobin:  Hemoglobin: 13.8 g/dL (03-23 @ 06:30)  Hemoglobin: 14.8 g/dL (03-22 @ 13:20)      Platelets: Platelet Count - Automated: 399 K/uL (03-23 @ 06:30)  Platelet Count - Automated: 430 K/uL (03-22 @ 13:20)      LIVER FUNCTIONS - ( 23 Mar 2024 06:30 )  Alb: 3.2 g/dL / Pro: 7.0 g/dL / ALK PHOS: 61 U/L / ALT: 27 U/L / AST: 32 U/L / GGT: x             Urinalysis Basic - ( 23 Mar 2024 06:30 )    Color: x / Appearance: x / SG: x / pH: x  Gluc: 117 mg/dL / Ketone: x  / Bili: x / Urobili: x   Blood: x / Protein: x / Nitrite: x   Leuk Esterase: x / RBC: x / WBC x   Sq Epi: x / Non Sq Epi: x / Bacteria: x        RADIOLOGY & ADDITIONAL STUDIES:      MICROBIOLOGY:  RECENT CULTURES:  03-23 .Abscess left leg abscess XXXX   No polymorphonuclear cells seen per low power field  Rare Gram positive cocci in pairs per oil power field XXXX    03-22 .Blood Blood XXXX XXXX   No growth at 24 hours

## 2024-03-24 NOTE — PROGRESS NOTE ADULT - SUBJECTIVE AND OBJECTIVE BOX
47 yo male s/p I/D Bilat LE abscess, doing well    less swelling, less erythematous Bilat LE cellulitis. open wounds, minimal drainage          03-24-24 @ 08:43    Vital Signs Last 24 Hrs  T(C): 36.7 (24 Mar 2024 05:00), Max: 36.8 (23 Mar 2024 14:45)  T(F): 98.1 (24 Mar 2024 05:00), Max: 98.3 (23 Mar 2024 14:45)  HR: 55 (24 Mar 2024 05:00) (55 - 64)  BP: 102/63 (24 Mar 2024 08:15) (95/60 - 115/66)  BP(mean): --  RR: 16 (24 Mar 2024 05:00) (16 - 18)  SpO2: 98% (24 Mar 2024 05:00) (96% - 98%)    Parameters below as of 24 Mar 2024 05:00  Patient On (Oxygen Delivery Method): room air                              13.8   15.50 )-----------( 399      ( 23 Mar 2024 06:30 )             41.7       03-23    140  |  107  |  11  ----------------------------<  117<H>  4.9   |  25  |  0.91    Ca    8.9      23 Mar 2024 06:30    TPro  7.0  /  Alb  3.2<L>  /  TBili  0.6  /  DBili  x   /  AST  32  /  ALT  27  /  AlkPhos  61  03-23      LIVER FUNCTIONS - ( 23 Mar 2024 06:30 )  Alb: 3.2 g/dL / Pro: 7.0 g/dL / ALK PHOS: 61 U/L / ALT: 27 U/L / AST: 32 U/L / GGT: x             MEDICATIONS  (STANDING):  cefTRIAXone   IVPB 2000 milliGRAM(s) IV Intermittent every 24 hours  DAPTOmycin IVPB 500 milliGRAM(s) IV Intermittent every 24 hours  diclofenac 75 milliGRAM(s) Oral two times a day  enoxaparin Injectable 40 milliGRAM(s) SubCutaneous every 24 hours  lactobacillus acidophilus 1 Tablet(s) Oral every 12 hours  pantoprazole    Tablet 40 milliGRAM(s) Oral before breakfast  sodium chloride 0.9%. 1000 milliLiter(s) (60 mL/Hr) IV Continuous <Continuous>    MEDICATIONS  (PRN):  acetaminophen     Tablet .. 650 milliGRAM(s) Oral every 6 hours PRN Temp greater or equal to 38C (100.4F), Mild Pain (1 - 3)  aluminum hydroxide/magnesium hydroxide/simethicone Suspension 30 milliLiter(s) Oral every 4 hours PRN Dyspepsia  magnesium hydroxide Suspension 30 milliLiter(s) Oral daily PRN Constipation  melatonin 3 milliGRAM(s) Oral at bedtime PRN Insomnia  ondansetron Injectable 4 milliGRAM(s) IV Push every 8 hours PRN Nausea and/or Vomiting  traMADol 50 milliGRAM(s) Oral three times a day PRN Moderate Pain (4 - 6)      MEDICATIONS  (STANDING):  cefTRIAXone   IVPB 2000 milliGRAM(s) IV Intermittent every 24 hours  DAPTOmycin IVPB 500 milliGRAM(s) IV Intermittent every 24 hours  diclofenac 75 milliGRAM(s) Oral two times a day  enoxaparin Injectable 40 milliGRAM(s) SubCutaneous every 24 hours  lactobacillus acidophilus 1 Tablet(s) Oral every 12 hours  pantoprazole    Tablet 40 milliGRAM(s) Oral before breakfast  sodium chloride 0.9%. 1000 milliLiter(s) (60 mL/Hr) IV Continuous <Continuous>

## 2024-03-25 LAB
-  AMPICILLIN/SULBACTAM: SIGNIFICANT CHANGE UP
-  CEFAZOLIN: SIGNIFICANT CHANGE UP
-  CLINDAMYCIN: SIGNIFICANT CHANGE UP
-  DAPTOMYCIN: SIGNIFICANT CHANGE UP
-  ERYTHROMYCIN: SIGNIFICANT CHANGE UP
-  GENTAMICIN: SIGNIFICANT CHANGE UP
-  LINEZOLID: SIGNIFICANT CHANGE UP
-  OXACILLIN: SIGNIFICANT CHANGE UP
-  PENICILLIN: SIGNIFICANT CHANGE UP
-  RIFAMPIN: SIGNIFICANT CHANGE UP
-  TETRACYCLINE: SIGNIFICANT CHANGE UP
-  TRIMETHOPRIM/SULFAMETHOXAZOLE: SIGNIFICANT CHANGE UP
-  VANCOMYCIN: SIGNIFICANT CHANGE UP
ALBUMIN SERPL ELPH-MCNC: 3.3 G/DL — SIGNIFICANT CHANGE UP (ref 3.3–5)
ALP SERPL-CCNC: 53 U/L — SIGNIFICANT CHANGE UP (ref 30–120)
ALT FLD-CCNC: 41 U/L — SIGNIFICANT CHANGE UP (ref 10–60)
ANION GAP SERPL CALC-SCNC: 9 MMOL/L — SIGNIFICANT CHANGE UP (ref 5–17)
AST SERPL-CCNC: 25 U/L — SIGNIFICANT CHANGE UP (ref 10–40)
BILIRUB SERPL-MCNC: 0.4 MG/DL — SIGNIFICANT CHANGE UP (ref 0.2–1.2)
BUN SERPL-MCNC: 11 MG/DL — SIGNIFICANT CHANGE UP (ref 7–23)
CALCIUM SERPL-MCNC: 8.9 MG/DL — SIGNIFICANT CHANGE UP (ref 8.4–10.5)
CHLORIDE SERPL-SCNC: 106 MMOL/L — SIGNIFICANT CHANGE UP (ref 96–108)
CO2 SERPL-SCNC: 27 MMOL/L — SIGNIFICANT CHANGE UP (ref 22–31)
CREAT SERPL-MCNC: 1 MG/DL — SIGNIFICANT CHANGE UP (ref 0.5–1.3)
EGFR: 93 ML/MIN/1.73M2 — SIGNIFICANT CHANGE UP
GLUCOSE SERPL-MCNC: 103 MG/DL — HIGH (ref 70–99)
HCT VFR BLD CALC: 40.3 % — SIGNIFICANT CHANGE UP (ref 39–50)
HGB BLD-MCNC: 13.5 G/DL — SIGNIFICANT CHANGE UP (ref 13–17)
MCHC RBC-ENTMCNC: 30.5 PG — SIGNIFICANT CHANGE UP (ref 27–34)
MCHC RBC-ENTMCNC: 33.5 GM/DL — SIGNIFICANT CHANGE UP (ref 32–36)
MCV RBC AUTO: 91 FL — SIGNIFICANT CHANGE UP (ref 80–100)
METHOD TYPE: SIGNIFICANT CHANGE UP
NRBC # BLD: 0 /100 WBCS — SIGNIFICANT CHANGE UP (ref 0–0)
PLATELET # BLD AUTO: 422 K/UL — HIGH (ref 150–400)
POTASSIUM SERPL-MCNC: 4.6 MMOL/L — SIGNIFICANT CHANGE UP (ref 3.5–5.3)
POTASSIUM SERPL-SCNC: 4.6 MMOL/L — SIGNIFICANT CHANGE UP (ref 3.5–5.3)
PROT SERPL-MCNC: 7 G/DL — SIGNIFICANT CHANGE UP (ref 6–8.3)
RBC # BLD: 4.43 M/UL — SIGNIFICANT CHANGE UP (ref 4.2–5.8)
RBC # FLD: 12.4 % — SIGNIFICANT CHANGE UP (ref 10.3–14.5)
SODIUM SERPL-SCNC: 142 MMOL/L — SIGNIFICANT CHANGE UP (ref 135–145)
WBC # BLD: 11.84 K/UL — HIGH (ref 3.8–10.5)
WBC # FLD AUTO: 11.84 K/UL — HIGH (ref 3.8–10.5)

## 2024-03-25 RX ADMIN — DICLOFENAC SODIUM 75 MILLIGRAM(S): 75 TABLET, DELAYED RELEASE ORAL at 17:41

## 2024-03-25 RX ADMIN — DICLOFENAC SODIUM 75 MILLIGRAM(S): 75 TABLET, DELAYED RELEASE ORAL at 06:56

## 2024-03-25 RX ADMIN — Medication 3 MILLIGRAM(S): at 21:15

## 2024-03-25 RX ADMIN — DICLOFENAC SODIUM 75 MILLIGRAM(S): 75 TABLET, DELAYED RELEASE ORAL at 07:26

## 2024-03-25 RX ADMIN — TRAMADOL HYDROCHLORIDE 50 MILLIGRAM(S): 50 TABLET ORAL at 21:15

## 2024-03-25 RX ADMIN — Medication 650 MILLIGRAM(S): at 16:55

## 2024-03-25 RX ADMIN — TRAMADOL HYDROCHLORIDE 50 MILLIGRAM(S): 50 TABLET ORAL at 09:00

## 2024-03-25 RX ADMIN — TRAMADOL HYDROCHLORIDE 50 MILLIGRAM(S): 50 TABLET ORAL at 08:32

## 2024-03-25 RX ADMIN — Medication 1 TABLET(S): at 17:41

## 2024-03-25 RX ADMIN — Medication 650 MILLIGRAM(S): at 16:27

## 2024-03-25 RX ADMIN — TRAMADOL HYDROCHLORIDE 50 MILLIGRAM(S): 50 TABLET ORAL at 21:45

## 2024-03-25 RX ADMIN — Medication 1 TABLET(S): at 06:56

## 2024-03-25 RX ADMIN — DAPTOMYCIN 120 MILLIGRAM(S): 500 INJECTION, POWDER, LYOPHILIZED, FOR SOLUTION INTRAVENOUS at 17:41

## 2024-03-25 RX ADMIN — PANTOPRAZOLE SODIUM 40 MILLIGRAM(S): 20 TABLET, DELAYED RELEASE ORAL at 06:56

## 2024-03-25 RX ADMIN — ENOXAPARIN SODIUM 40 MILLIGRAM(S): 100 INJECTION SUBCUTANEOUS at 08:14

## 2024-03-25 RX ADMIN — DICLOFENAC SODIUM 75 MILLIGRAM(S): 75 TABLET, DELAYED RELEASE ORAL at 18:05

## 2024-03-25 NOTE — PROGRESS NOTE ADULT - SUBJECTIVE AND OBJECTIVE BOX
PROGRESS NOTE  Patient is a 48y old  Male who presents with a chief complaint of leg swelling and redness (24 Mar 2024 21:47)    Chart and available morning labs /imaging are reviewed electronically , urgent issues addressed . More information  is being added upon completion of rounds , when more information is collected and management discussed with consultants , medical staff and social service/case management on the floor   OVERNIGHT    No new issues reported by medical staff . All above noted   HPI:  48-year-old male with no significant past medical history presents with complaint of redness to bilateral lower legs x 10 days.  Patient states that he has history of folliculitis and noticed rash/?insect bites while in [place] to his bilateral lower legs 10 days ago however states that his symptoms have been getting worse with surrounding redness and occasional weeping.  States that he was seen at urgent care on 8/18 and started on Bactrim without improvement.  States that he returned to urgent care today and was sent to ER for IV antibiotics.  Denies fever, chills, nausea, vomiting, cp, sob or other symptoms.  pcp: Dr. Harrison Scott (22 Mar 2024 16:55)    PAST MEDICAL & SURGICAL HISTORY:  IBS (irritable bowel syndrome)      Nicotine use disorder          MEDICATIONS  (STANDING):  cefTRIAXone   IVPB 2000 milliGRAM(s) IV Intermittent every 24 hours  DAPTOmycin IVPB 500 milliGRAM(s) IV Intermittent every 24 hours  diclofenac 75 milliGRAM(s) Oral two times a day  enoxaparin Injectable 40 milliGRAM(s) SubCutaneous every 24 hours  lactobacillus acidophilus 1 Tablet(s) Oral every 12 hours  pantoprazole    Tablet 40 milliGRAM(s) Oral before breakfast  sodium chloride 0.9%. 1000 milliLiter(s) (60 mL/Hr) IV Continuous <Continuous>    MEDICATIONS  (PRN):  acetaminophen     Tablet .. 650 milliGRAM(s) Oral every 6 hours PRN Temp greater or equal to 38C (100.4F), Mild Pain (1 - 3)  aluminum hydroxide/magnesium hydroxide/simethicone Suspension 30 milliLiter(s) Oral every 4 hours PRN Dyspepsia  magnesium hydroxide Suspension 30 milliLiter(s) Oral daily PRN Constipation  melatonin 3 milliGRAM(s) Oral at bedtime PRN Insomnia  ondansetron Injectable 4 milliGRAM(s) IV Push every 8 hours PRN Nausea and/or Vomiting  traMADol 50 milliGRAM(s) Oral three times a day PRN Moderate Pain (4 - 6)      OBJECTIVE    T(C): 36.6 (03-25-24 @ 05:08), Max: 36.8 (03-24-24 @ 13:57)  HR: 55 (03-25-24 @ 05:08) (55 - 63)  BP: 98/66 (03-25-24 @ 05:08) (98/66 - 109/64)  RR: 18 (03-25-24 @ 05:08) (16 - 18)  SpO2: 95% (03-25-24 @ 05:08) (93% - 95%)  Wt(kg): --  I&O's Summary        REVIEW OF SYSTEMS:  CONSTITUTIONAL: No fever, weight loss, or fatigue  EYES: No eye pain, visual disturbances, or discharge  ENMT:   No sinus or throat pain  NECK: No pain or stiffness  RESPIRATORY: No cough, wheezing, chills or hemoptysis; No shortness of breath  CARDIOVASCULAR: No chest pain, palpitations, dizziness, or leg swelling  GASTROINTESTINAL: No abdominal pain. No nausea, vomiting; No diarrhea or constipation. No melena or hematochezia.  GENITOURINARY: No dysuria, frequency, hematuria, or incontinence  NEUROLOGICAL: No headaches, memory loss, loss of strength, numbness, or tremors  SKIN: No itching, burning, rashes, or lesions   MUSCULOSKELETAL: No joint pain or swelling; No muscle, back, or extremity pain    PHYSICAL EXAM:  Appearance: NAD. VS past 24 hrs -as above   HEENT:   Moist oral mucosa. Conjunctiva clear b/l.   Neck : supple  Respiratory: Lungs CTAB.  Gastrointestinal:  Soft, nontender. No rebound. No rigidity. BS present	  Cardiovascular: RRR ,S1S2 present  Neurologic: Non-focal. Moving all extremities.  Extremities: No edema. No erythema. No calf tenderness.  Skin: No rashes, No ecchymoses, No cyanosis.	  wounds ,skin lesions-See skin assesment flow sheet   LABS:                        13.5   11.84 )-----------( 422      ( 25 Mar 2024 07:53 )             40.3     03-25    142  |  106  |  11  ----------------------------<  103<H>  4.6   |  27  |  1.00    Ca    8.9      25 Mar 2024 07:53    TPro  7.0  /  Alb  3.3  /  TBili  0.4  /  DBili  x   /  AST  25  /  ALT  41  /  AlkPhos  53  03-25    CAPILLARY BLOOD GLUCOSE          Urinalysis Basic - ( 25 Mar 2024 07:53 )    Color: x / Appearance: x / SG: x / pH: x  Gluc: 103 mg/dL / Ketone: x  / Bili: x / Urobili: x   Blood: x / Protein: x / Nitrite: x   Leuk Esterase: x / RBC: x / WBC x   Sq Epi: x / Non Sq Epi: x / Bacteria: x        Culture - Abscess with Gram Stain (collected 23 Mar 2024 12:49)  Source: .Abscess left leg abscess  Gram Stain (24 Mar 2024 00:04):    No polymorphonuclear cells seen per low power field    Rare Gram positive cocci in pairs per oil power field  Preliminary Report (24 Mar 2024 18:12):    Moderate Staphylococcus aureus    Culture - Blood (collected 22 Mar 2024 13:20)  Source: .Blood Blood  Preliminary Report (24 Mar 2024 22:01):    No growth at 48 Hours    Culture - Blood (collected 22 Mar 2024 13:20)  Source: .Blood Blood  Preliminary Report (24 Mar 2024 22:01):    No growth at 48 Hours      RADIOLOGY & ADDITIONAL TESTS:   reviewed elctronically  ASSESSMENT/PLAN: 	     PROGRESS NOTE  Patient is a 48y old  Male who presents with a chief complaint of leg swelling and redness (24 Mar 2024 21:47)    Chart and available morning labs /imaging are reviewed electronically , urgent issues addressed . More information  is being added upon completion of rounds , when more information is collected and management discussed with consultants , medical staff and social service/case management on the floor   OVERNIGHT    No new issues reported by medical staff . All above noted   HPI:  48-year-old male with no significant past medical history presents with complaint of redness to bilateral lower legs x 10 days.  Patient states that he has history of folliculitis and noticed rash/?insect bites while in [place] to his bilateral lower legs 10 days ago however states that his symptoms have been getting worse with surrounding redness and occasional weeping.  States that he was seen at urgent care on 8/18 and started on Bactrim without improvement.  States that he returned to urgent care today and was sent to ER for IV antibiotics.  Denies fever, chills, nausea, vomiting, cp, sob or other symptoms.  pcp: Dr. Harrison Scott (22 Mar 2024 16:55)    PAST MEDICAL & SURGICAL HISTORY:  IBS (irritable bowel syndrome)      Nicotine use disorder          MEDICATIONS  (STANDING):  cefTRIAXone   IVPB 2000 milliGRAM(s) IV Intermittent every 24 hours  DAPTOmycin IVPB 500 milliGRAM(s) IV Intermittent every 24 hours  diclofenac 75 milliGRAM(s) Oral two times a day  enoxaparin Injectable 40 milliGRAM(s) SubCutaneous every 24 hours  lactobacillus acidophilus 1 Tablet(s) Oral every 12 hours  pantoprazole    Tablet 40 milliGRAM(s) Oral before breakfast  sodium chloride 0.9%. 1000 milliLiter(s) (60 mL/Hr) IV Continuous <Continuous>    MEDICATIONS  (PRN):  acetaminophen     Tablet .. 650 milliGRAM(s) Oral every 6 hours PRN Temp greater or equal to 38C (100.4F), Mild Pain (1 - 3)  aluminum hydroxide/magnesium hydroxide/simethicone Suspension 30 milliLiter(s) Oral every 4 hours PRN Dyspepsia  magnesium hydroxide Suspension 30 milliLiter(s) Oral daily PRN Constipation  melatonin 3 milliGRAM(s) Oral at bedtime PRN Insomnia  ondansetron Injectable 4 milliGRAM(s) IV Push every 8 hours PRN Nausea and/or Vomiting  traMADol 50 milliGRAM(s) Oral three times a day PRN Moderate Pain (4 - 6)      OBJECTIVE    T(C): 36.6 (03-25-24 @ 05:08), Max: 36.8 (03-24-24 @ 13:57)  HR: 55 (03-25-24 @ 05:08) (55 - 63)  BP: 98/66 (03-25-24 @ 05:08) (98/66 - 109/64)  RR: 18 (03-25-24 @ 05:08) (16 - 18)  SpO2: 95% (03-25-24 @ 05:08) (93% - 95%)  Wt(kg): --  I&O's Summary        REVIEW OF SYSTEMS:  CONSTITUTIONAL: No fever, weight loss, or fatigue  EYES: No eye pain, visual disturbances, or discharge  ENMT:   No sinus or throat pain  NECK: No pain or stiffness  RESPIRATORY: No cough, wheezing, chills or hemoptysis; No shortness of breath  CARDIOVASCULAR: No chest pain, palpitations, dizziness, or leg swelling  GASTROINTESTINAL: No abdominal pain. No nausea, vomiting; No diarrhea or constipation. No melena or hematochezia.  GENITOURINARY: No dysuria, frequency, hematuria, or incontinence  NEUROLOGICAL: No headaches, memory loss, loss of strength, numbness, or tremors  SKIN: No itching, burning, rashes, or lesions   MUSCULOSKELETAL: No joint pain or swelling; No muscle, back, or extremity pain    PHYSICAL EXAM:  Appearance: NAD. VS past 24 hrs -as above   HEENT:   Moist oral mucosa. Conjunctiva clear b/l.   Neck : supple  Respiratory: Lungs CTAB.  Gastrointestinal:  Soft, nontender. No rebound. No rigidity. BS present	  Cardiovascular: RRR ,S1S2 present  Neurologic: Non-focal. Moving all extremities.  Extremities: No edema. No erythema. No calf tenderness. Healing postop sites b/l shins , fading cellulitis   Skin: No rashes, No ecchymoses, No cyanosis.	  wounds ,skin lesions-See skin assesment flow sheet   LABS:                        13.5   11.84 )-----------( 422      ( 25 Mar 2024 07:53 )             40.3     03-25    142  |  106  |  11  ----------------------------<  103<H>  4.6   |  27  |  1.00    Ca    8.9      25 Mar 2024 07:53    TPro  7.0  /  Alb  3.3  /  TBili  0.4  /  DBili  x   /  AST  25  /  ALT  41  /  AlkPhos  53  03-25    CAPILLARY BLOOD GLUCOSE          Urinalysis Basic - ( 25 Mar 2024 07:53 )    Color: x / Appearance: x / SG: x / pH: x  Gluc: 103 mg/dL / Ketone: x  / Bili: x / Urobili: x   Blood: x / Protein: x / Nitrite: x   Leuk Esterase: x / RBC: x / WBC x   Sq Epi: x / Non Sq Epi: x / Bacteria: x        Culture - Abscess with Gram Stain (collected 23 Mar 2024 12:49)  Source: .Abscess left leg abscess  Gram Stain (24 Mar 2024 00:04):    No polymorphonuclear cells seen per low power field    Rare Gram positive cocci in pairs per oil power field  Preliminary Report (24 Mar 2024 18:12):    Moderate Staphylococcus aureus    Culture - Blood (collected 22 Mar 2024 13:20)  Source: .Blood Blood  Preliminary Report (24 Mar 2024 22:01):    No growth at 48 Hours    Culture - Blood (collected 22 Mar 2024 13:20)  Source: .Blood Blood  Preliminary Report (24 Mar 2024 22:01):    No growth at 48 Hours      RADIOLOGY & ADDITIONAL TESTS:   reviewed elctronically  ASSESSMENT/PLAN: 	    25 minutes aggregate time was spent on this visit, 50% visit time spent in care co-ordination with other attendings and counselling patient .I have discussed care plan with patient / HCP/family member ,who expressed understanding of problems treatment and their effect and side effects, alternatives in details. I have asked if they have any questions and concerns and appropriately addressed them to best of my ability.

## 2024-03-25 NOTE — PROGRESS NOTE ADULT - SUBJECTIVE AND OBJECTIVE BOX
ELLIOT DAWSON is a 48yMale , patient examined and chart reviewed.    INTERVAL HPI/ OVERNIGHT EVENTS:   Afebrile. Feeling better.    PAST MEDICAL & SURGICAL HISTORY:  IBS (irritable bowel syndrome)  Nicotine use disorder      For details regarding the patient's social history, family history, and other miscellaneous elements, please refer the initial infectious diseases consultation and/or the admitting history and physical examination for this admission.    ROS:  CONSTITUTIONAL:  Negative fever or chills  EYES:  Negative  blurry vision or double vision  CARDIOVASCULAR:  Negative for chest pain or palpitations  RESPIRATORY:  Negative for cough, wheezing, or SOB   GASTROINTESTINAL:  Negative for nausea, vomiting, diarrhea, constipation, or abdominal pain  GENITOURINARY:  Negative frequency, urgency or dysuria  NEUROLOGIC:  No headache, confusion, dizziness, lightheadedness  All other systems were reviewed and are negative     No Known Allergies      Current inpatient medications :    ANTIBIOTICS/RELEVANT:  DAPTOmycin IVPB 500 milliGRAM(s) IV Intermittent every 24 hours    MEDICATIONS  (STANDING):  diclofenac 75 milliGRAM(s) Oral two times a day  enoxaparin Injectable 40 milliGRAM(s) SubCutaneous every 24 hours  lactobacillus acidophilus 1 Tablet(s) Oral every 12 hours  pantoprazole    Tablet 40 milliGRAM(s) Oral before breakfast    MEDICATIONS  (PRN):  acetaminophen     Tablet .. 650 milliGRAM(s) Oral every 6 hours PRN Temp greater or equal to 38C (100.4F), Mild Pain (1 - 3)  aluminum hydroxide/magnesium hydroxide/simethicone Suspension 30 milliLiter(s) Oral every 4 hours PRN Dyspepsia  magnesium hydroxide Suspension 30 milliLiter(s) Oral daily PRN Constipation  melatonin 3 milliGRAM(s) Oral at bedtime PRN Insomnia  ondansetron Injectable 4 milliGRAM(s) IV Push every 8 hours PRN Nausea and/or Vomiting  traMADol 50 milliGRAM(s) Oral three times a day PRN Moderate Pain (4 - 6)      Objective:  Vital Signs Last 24 Hrs  T(C): 36.4 (25 Mar 2024 09:28), Max: 36.8 (24 Mar 2024 13:57)  T(F): 97.5 (25 Mar 2024 09:28), Max: 98.3 (24 Mar 2024 13:57)  HR: 62 (25 Mar 2024 09:28) (55 - 63)  BP: 113/64 (25 Mar 2024 09:28) (98/66 - 113/64)  RR: 18 (25 Mar 2024 09:28) (16 - 18)  SpO2: 95% (25 Mar 2024 09:28) (93% - 95%)    Parameters below as of 25 Mar 2024 09:28  Patient On (Oxygen Delivery Method): room air      Physical Exam:  General: no acute distress  Neck: supple, trachea midline  Lungs: clear, no wheeze/rhonchi  Cardiovascular: regular rate and rhythm, S1 S2  Abdomen: soft, nontender,  bowel sounds normal  Neurological: alert and oriented x3  Skin: no rash  Extremities: Left Leg abscess area with induration  + erythema tender improving      LABS:              13.5   11.84 )-----------( 422      ( 25 Mar 2024 07:53 )             40.3   03-25    142  |  106  |  11  ----------------------------<  103<H>  4.6   |  27  |  1.00    Ca    8.9      25 Mar 2024 07:53    TPro  7.0  /  Alb  3.3  /  TBili  0.4  /  DBili  x   /  AST  25  /  ALT  41  /  AlkPhos  53  03-25      MICROBIOLOGY:  Culture - Abscess with Gram Stain (03.23.24 @ 12:49)    Gram Stain:   No polymorphonuclear cells seen per low power field  Rare Gram positive cocci in pairs per oil power field   Specimen Source: .Abscess left leg abscess   Culture Results:   Moderate Staphylococcus aureus      Culture - Blood (collected 22 Mar 2024 13:20)  Source: .Blood Blood  Preliminary Report (23 Mar 2024 22:01):    No growth at 24 hours    Culture - Blood (collected 22 Mar 2024 13:20)  Source: .Blood Blood  Preliminary Report (23 Mar 2024 22:01):    No growth at 24 hours      RADIOLOGY & ADDITIONAL STUDIES:        Assessment :  49YO M no significant past medical history who presented to the hospital with c/o bilateral LE redness pain and swelling  x 10 days.  Patient states that he has history of folliculitis. Was seen at urgent care on 8/18 and started on Bactrim without improvement. Denies fever, chills, nausea, vomiting, cp, sob or other symptoms. In ED noted to have Primo LE abscess with cellulitis. wbc 18K  Sp I&D in ED.  Abscess Cultures with Staph Aureus sensi pending  WBC better  Clinically better    Plan :   Cont Dapto   Fu abscess cultures and change to po   Elevate legs  Pulm toileting   Increase activity  Stable from ID standpoint      Continue with present regiment.  Appropriate use of antibiotics and adverse effects reviewed.      I have discussed the above plan of care with patient in detail. He expressed understanding of the  treatment plan . Risks, benefits and alternatives discussed in detail. I have asked if he has any questions or concerns and appropriately addressed them to the best of my ability .    > 35 minutes were spent in direct patient care reviewing notes, medications ,labs data/ imaging , discussion with multidisciplinary team.    Thank you for allowing me to participate in care of your patient .    Dusty Hammer MD  Infectious Disease  [phone]

## 2024-03-25 NOTE — CASE MANAGEMENT PROGRESS NOTE - NSCMPROGRESSNOTE_GEN_ALL_CORE
Patient discussed during rounds and remains acute. Patient receiving IV Rocephin. Anticipated discharge for 03/26/24 with dry dressing. Patient advised supplies and dry dressing teaching will be provided prior to discharge by bedside RN. CM will continue to collaborate with interdisciplinary team and remain available to assist.

## 2024-03-26 VITALS
OXYGEN SATURATION: 96 % | HEART RATE: 72 BPM | TEMPERATURE: 98 F | RESPIRATION RATE: 18 BRPM | DIASTOLIC BLOOD PRESSURE: 74 MMHG | SYSTOLIC BLOOD PRESSURE: 137 MMHG

## 2024-03-26 LAB
ANION GAP SERPL CALC-SCNC: 5 MMOL/L — SIGNIFICANT CHANGE UP (ref 5–17)
BUN SERPL-MCNC: 16 MG/DL — SIGNIFICANT CHANGE UP (ref 7–23)
CALCIUM SERPL-MCNC: 9 MG/DL — SIGNIFICANT CHANGE UP (ref 8.4–10.5)
CHLORIDE SERPL-SCNC: 106 MMOL/L — SIGNIFICANT CHANGE UP (ref 96–108)
CO2 SERPL-SCNC: 30 MMOL/L — SIGNIFICANT CHANGE UP (ref 22–31)
CREAT SERPL-MCNC: 0.98 MG/DL — SIGNIFICANT CHANGE UP (ref 0.5–1.3)
EGFR: 96 ML/MIN/1.73M2 — SIGNIFICANT CHANGE UP
GLUCOSE SERPL-MCNC: 112 MG/DL — HIGH (ref 70–99)
HCT VFR BLD CALC: 39.4 % — SIGNIFICANT CHANGE UP (ref 39–50)
HGB BLD-MCNC: 13.4 G/DL — SIGNIFICANT CHANGE UP (ref 13–17)
MCHC RBC-ENTMCNC: 30.8 PG — SIGNIFICANT CHANGE UP (ref 27–34)
MCHC RBC-ENTMCNC: 34 GM/DL — SIGNIFICANT CHANGE UP (ref 32–36)
MCV RBC AUTO: 90.6 FL — SIGNIFICANT CHANGE UP (ref 80–100)
NRBC # BLD: 0 /100 WBCS — SIGNIFICANT CHANGE UP (ref 0–0)
PLATELET # BLD AUTO: 433 K/UL — HIGH (ref 150–400)
POTASSIUM SERPL-MCNC: 4.8 MMOL/L — SIGNIFICANT CHANGE UP (ref 3.5–5.3)
POTASSIUM SERPL-SCNC: 4.8 MMOL/L — SIGNIFICANT CHANGE UP (ref 3.5–5.3)
RBC # BLD: 4.35 M/UL — SIGNIFICANT CHANGE UP (ref 4.2–5.8)
RBC # FLD: 12.3 % — SIGNIFICANT CHANGE UP (ref 10.3–14.5)
SODIUM SERPL-SCNC: 141 MMOL/L — SIGNIFICANT CHANGE UP (ref 135–145)
WBC # BLD: 12.48 K/UL — HIGH (ref 3.8–10.5)
WBC # FLD AUTO: 12.48 K/UL — HIGH (ref 3.8–10.5)

## 2024-03-26 PROCEDURE — 96365 THER/PROPH/DIAG IV INF INIT: CPT

## 2024-03-26 PROCEDURE — 87077 CULTURE AEROBIC IDENTIFY: CPT

## 2024-03-26 PROCEDURE — 83605 ASSAY OF LACTIC ACID: CPT

## 2024-03-26 PROCEDURE — 85027 COMPLETE CBC AUTOMATED: CPT

## 2024-03-26 PROCEDURE — 87186 SC STD MICRODIL/AGAR DIL: CPT

## 2024-03-26 PROCEDURE — 85025 COMPLETE CBC W/AUTO DIFF WBC: CPT

## 2024-03-26 PROCEDURE — 36415 COLL VENOUS BLD VENIPUNCTURE: CPT

## 2024-03-26 PROCEDURE — 71045 X-RAY EXAM CHEST 1 VIEW: CPT

## 2024-03-26 PROCEDURE — 80048 BASIC METABOLIC PNL TOTAL CA: CPT

## 2024-03-26 PROCEDURE — 85610 PROTHROMBIN TIME: CPT

## 2024-03-26 PROCEDURE — 87075 CULTR BACTERIA EXCEPT BLOOD: CPT

## 2024-03-26 PROCEDURE — 87070 CULTURE OTHR SPECIMN AEROBIC: CPT

## 2024-03-26 PROCEDURE — 87205 SMEAR GRAM STAIN: CPT

## 2024-03-26 PROCEDURE — 87040 BLOOD CULTURE FOR BACTERIA: CPT

## 2024-03-26 PROCEDURE — 99285 EMERGENCY DEPT VISIT HI MDM: CPT

## 2024-03-26 PROCEDURE — 85652 RBC SED RATE AUTOMATED: CPT

## 2024-03-26 PROCEDURE — 80053 COMPREHEN METABOLIC PANEL: CPT

## 2024-03-26 PROCEDURE — 86703 HIV-1/HIV-2 1 RESULT ANTBDY: CPT

## 2024-03-26 PROCEDURE — 87640 STAPH A DNA AMP PROBE: CPT

## 2024-03-26 PROCEDURE — 87641 MR-STAPH DNA AMP PROBE: CPT

## 2024-03-26 PROCEDURE — 93005 ELECTROCARDIOGRAM TRACING: CPT

## 2024-03-26 RX ORDER — LACTOBACILLUS ACIDOPHILUS 100MM CELL
2 CAPSULE ORAL
Qty: 28 | Refills: 0
Start: 2024-03-26 | End: 2024-04-08

## 2024-03-26 RX ORDER — LINEZOLID 600 MG/300ML
1 INJECTION, SOLUTION INTRAVENOUS
Qty: 14 | Refills: 0
Start: 2024-03-26 | End: 2024-04-01

## 2024-03-26 RX ORDER — MUPIROCIN 20 MG/G
1 OINTMENT TOPICAL
Qty: 1 | Refills: 0
Start: 2024-03-26 | End: 2024-04-01

## 2024-03-26 RX ORDER — ACETAMINOPHEN 500 MG
2 TABLET ORAL
Qty: 0 | Refills: 0 | DISCHARGE
Start: 2024-03-26

## 2024-03-26 RX ORDER — MUPIROCIN 20 MG/G
1 OINTMENT TOPICAL
Refills: 0 | Status: DISCONTINUED | OUTPATIENT
Start: 2024-03-26 | End: 2024-03-26

## 2024-03-26 RX ORDER — OMEPRAZOLE 10 MG/1
1 CAPSULE, DELAYED RELEASE ORAL
Qty: 0 | Refills: 0 | DISCHARGE

## 2024-03-26 RX ADMIN — DICLOFENAC SODIUM 75 MILLIGRAM(S): 75 TABLET, DELAYED RELEASE ORAL at 05:23

## 2024-03-26 RX ADMIN — Medication 1 TABLET(S): at 05:23

## 2024-03-26 RX ADMIN — ENOXAPARIN SODIUM 40 MILLIGRAM(S): 100 INJECTION SUBCUTANEOUS at 08:09

## 2024-03-26 RX ADMIN — TRAMADOL HYDROCHLORIDE 50 MILLIGRAM(S): 50 TABLET ORAL at 08:54

## 2024-03-26 RX ADMIN — DAPTOMYCIN 120 MILLIGRAM(S): 500 INJECTION, POWDER, LYOPHILIZED, FOR SOLUTION INTRAVENOUS at 14:50

## 2024-03-26 RX ADMIN — PANTOPRAZOLE SODIUM 40 MILLIGRAM(S): 20 TABLET, DELAYED RELEASE ORAL at 05:23

## 2024-03-26 RX ADMIN — TRAMADOL HYDROCHLORIDE 50 MILLIGRAM(S): 50 TABLET ORAL at 09:30

## 2024-03-26 NOTE — DISCHARGE NOTE PROVIDER - CARE PROVIDER_API CALL
Harrison Scott  Internal Medicine  [address] [place] [address]  Phone: [phone]  Fax: [phone])-  Follow Up Time: 1 week    Len Roberts.  Surgery  [address] [place] [id number]  Phone: [phone]  Fax: [phone]  Follow Up Time: 1 week

## 2024-03-26 NOTE — DISCHARGE NOTE NURSING/CASE MANAGEMENT/SOCIAL WORK - PATIENT PORTAL LINK FT
You can access the FollowMyHealth Patient Portal offered by [facility] by registering at the following website: [email or web address]. By joining [facility]’s FollowMyHealth portal, you will also be able to view your health information using other applications (apps) compatible with our system.

## 2024-03-26 NOTE — DISCHARGE NOTE PROVIDER - NSDCCPCAREPLAN_GEN_ALL_CORE_FT
PRINCIPAL DISCHARGE DIAGNOSIS  Diagnosis: Bilateral cellulitis of lower leg  Assessment and Plan of Treatment:       SECONDARY DISCHARGE DIAGNOSES  Diagnosis: Cellulitis and abscess of lower leg  Assessment and Plan of Treatment:     Diagnosis: IBS (irritable bowel syndrome)  Assessment and Plan of Treatment:     Diagnosis: Nicotine use disorder  Assessment and Plan of Treatment:

## 2024-03-26 NOTE — DISCHARGE NOTE PROVIDER - NSDCCPTREATMENT_GEN_ALL_CORE_FT
PRINCIPAL PROCEDURE  Procedure: Complex incision and drainage, abscess, skin  Findings and Treatment:

## 2024-03-26 NOTE — DISCHARGE NOTE PROVIDER - NSDCFUADDAPPT_GEN_ALL_CORE_FT
Patient to follow up with Infectious Disease -Dr. Debbie Rodriguez [phone])Patient to follow up with Infectious Disease -Dr. Debbie Rodriguez [phone])Smoking cessation recommended

## 2024-03-26 NOTE — PROGRESS NOTE ADULT - ASSESSMENT
48-year-old male with no significant past medical history presents with complaint of redness to bilateral lower legs x 10 days.  Patient states that he has history of folliculitis and noticed rash/?insect bites while in [place] to his bilateral lower legs 10 days ago however states that his symptoms have been getting worse with surrounding redness and occasional weeping.  States that he was seen at urgent care on 8/18 and started on Bactrim without improvement.  States that he returned to urgent care today and was sent to ER for IV antibiotics.  Denies fever, chills, nausea, vomiting, cp, sob or other symptoms.  pcp: Dr. Harrison Scott
47 yo male s/p I/D Bilat LE abscess  -cont Abx  -Leg elevation
   REASON FOR VISIT  .. Management of problems listed below        REVIEW OF SYMPTOMS   Able to give ROS  Yes     RELIABILITY +/-   CONSTITUTIONAL Weakness Yes    ENDOCRINE  No heat or cold intolerance    ALLERGY No hives  Sore throat No stridor  RESP Shortness of breath YES   NEURO New weakness No   CARDIAC   Palpitations No         PHYSICAL EXAM    HEENT Unremarkable  atraumatic   RESP Fair air entry  Harsh breath sound   CARDIAC S1 S2 No S3     NO JVD    ABDOMEN No hepatosplenomegaly   PEDAL EDEMA present No calf tenderness  NO rash     GENERAL DATA .   GOC.    ..  3/23/2024 full code  ICU STAY.    .. no   COVID.   ..   BEST PRACTICE ISSUES.    HOB ELEVATN.    .. Yes  DVT PPLX.   .. 3/22 lovenox 40   IV fl.   .. 3/22 ns 60   DIET.     ..  3/22 regl     ALLGY.   ..   nka     WT.   ..  3/23/2024 90   BMI.   .. 3/23/2024 28     PROCEDURES.   ..   BOLUS.   ..    ABGS.   .  VS/ PO/IO/ VENT/ DRIPS.   3/23/2024 afeb 63 100/60   3/23/2024 ra 95%       . PRESENTATION.  49YO M no significant past medical history who presented to the hospital with c/o bilateral LE redness pain and swelling  x 10 days.  Patient states that he has history of folliculitis. Was seen at urgent care on 8/18 and started on Bactrim without improvement. Denies fever, chills, nausea, vomiting, cp, sob or other symptoms. In ED noted to have Primo LE abscess with cellulitis. wbc 18K  Sp I&D in ED. unfortunately no cultures sent  . COURSE.  . 3/22 I&D done in ER     . ACTIVE/SIGNIFICANT ISSUE(S) ASSESSMENT/RECOMMENDATIONS  (A/R).  INFECTION   .. LABS   .... w 3/22-3/23/2024 w 18-15   .... Cr 3/23/2024 Cr .9   .. IMAGING  .... CXR 3/22  napd   .. MICROBIO.  .... HIV 3/22 (-)   .. ANTIBIO   .... 3/22 Rocephin 2g x 8d   .... 3/22 Daptomycin 500/d   .. A/R  .... 3/23/2024 cellulitis legs failed outpt bactrim now on rocephin dapto sp I&D 3/22 in ER     . DC PLANNING.   . 3/23/2024 cellulitis leg failed outpt bactrim now on rocephin dapto     TIME SPENT.  . Over 36 minutes aggregate care time spent on encounter; activities included   direct patient care, counseling and/or coordinating care reviewing notes, lab data/ imaging , discussion with multidisciplinary team/ patient  /family and explaining in detail risks, benefits, alternatives  of the recommendations     PATIENT.  . SAMIR ZARATE 48 m 1975 3/22/2024 DR JUAN CARLOS HERR   
48-year-old male with no significant past medical history presents with complaint of redness to bilateral lower legs x 10 days.  Patient states that he has history of folliculitis and noticed rash/?insect bites while in [place] to his bilateral lower legs 10 days ago however states that his symptoms have been getting worse with surrounding redness and occasional weeping.  States that he was seen at urgent care on 8/18 and started on Bactrim without improvement.  States that he returned to urgent care today and was sent to ER for IV antibiotics.  Denies fever, chills, nausea, vomiting, cp, sob or other symptoms.  pcp: Dr. Harrison Scott
   REASON FOR VISIT  .. Management of problems listed below        REVIEW OF SYMPTOMS   Able to give ROS  Yes     RELIABILITY +/-   CONSTITUTIONAL Weakness Yes    ENDOCRINE  No heat or cold intolerance    ALLERGY No hives  Sore throat No stridor  RESP Shortness of breath YES   NEURO New weakness No   CARDIAC   Palpitations No         PHYSICAL EXAM    HEENT Unremarkable  atraumatic   RESP Fair air entry  Harsh breath sound   CARDIAC S1 S2 No S3     NO JVD    ABDOMEN No hepatosplenomegaly   PEDAL EDEMA present No calf tenderness  NO rash     GENERAL DATA .   GOC.    ..  3/23/2024 full code  ICU STAY.    .. no   COVID.   ..   BEST PRACTICE ISSUES.    HOB ELEVATN.    .. Yes  DVT PPLX.   .. 3/22 lovenox 40   IV fl.   .. 3/22 ns 60   DIET.     ..  3/22 regl     ALLGY.   ..   nka     WT.   ..  3/23/2024 90   BMI.   .. 3/23/2024 28       ABGS.   .  VS/ PO/IO/ VENT/ DRIPS.   3/24/2024 afeb59 95/60   3/24/2024 ra 98%     . PRESENTATION.  49YO M no significant past medical history who presented to the hospital with c/o bilateral LE redness pain and swelling  x 10 days.  Patient states that he has history of folliculitis. Was seen at urgent care on 8/18 and started on Bactrim without improvement. Denies fever, chills, nausea, vomiting, cp, sob or other symptoms. In ED noted to have Primo LE abscess with cellulitis. wbc 18K  Sp I&D in ED. unfortunately no cultures sent  . COURSE.  . 3/22 I&D done in ER     . ACTIVE/SIGNIFICANT ISSUE(S) ASSESSMENT/RECOMMENDATIONS  (A/R).  INFECTION   .. LABS   .... w 3/22-3/23/2024 w 18-15   .... Cr 3/23/2024 Cr .9   .. IMAGING  .... CXR 3/22  napd   .. MICROBIO.  .... HIV 3/22 (-)  .... abscess 3/23 mod staph aureus    .... bc 3/22 (-)   .. ANTIBIO   .... 3/22 Rocephin 2g x 8d   .... 3/22 Daptomycin 500/d   .. A/R  .... 3/23/2024 cellulitis legs failed outpt bactrim now on rocephin dapto sp I&D 3/22 in ER     . DC PLANNING.   . 3/23/2024 cellulitis leg failed outpt bactrim now on rocephin dapto     TIME SPENT.  . Over 36 minutes aggregate care time spent on encounter; activities included   direct patient care, counseling and/or coordinating care reviewing notes, lab data/ imaging , discussion with multidisciplinary team/ patient  /family and explaining in detail risks, benefits, alternatives  of the recommendations     PATIENT.  . SAMIR ZARATE 48 m 1975 3/22/2024 DR JUAN CARLOS HERR

## 2024-03-26 NOTE — DISCHARGE NOTE NURSING/CASE MANAGEMENT/SOCIAL WORK - NSTRANSFERBELONGINGSRESP_GEN_A_NUR
Rx request for sertraline.    LOV 09/03/19  Last Refill/Quantity 02/03/20 for #30 with 0  Last pertinent labs n/a  
yes

## 2024-03-26 NOTE — DISCHARGE NOTE PROVIDER - HOSPITAL COURSE
48-year-old male with no significant past medical history presents with complaint of redness to bilateral lower legs x 10 days.  Patient states that he has history of folliculitis and noticed rash/?insect bites while in [place] to his bilateral lower legs 10 days ago however states that his symptoms have been getting worse with surrounding redness and occasional weeping.  States that he was seen at urgent care on 8/18 and started on Bactrim without improvement.  States that he returned to urgent care today and was sent to ER for IV antibiotics.  Denies fever, chills, nausea, vomiting, cp, sob or other symptoms.  pcp: Dr. Harrison Scott      Problem/Plan - 1:  ·  Problem: Cellulitis and abscess of lower leg.   ·  Plan: likely  2/2 to  severe allergic reaction to mosquitoes bites ,denies poison ivy contact  ,seen by ID CONS - start  Dapto and Rocephin , s/p debridement  ,surgery is following   Get wound cultures  Fu cultures  MRSA screen  Elevate legs , topical care , daily shower and dry dressing.    Problem/Plan - 2:  ·  Problem: IBS (irritable bowel syndrome).   ·  Plan: on lactose free diet , bowel regimen.    Problem/Plan - 3:  ·  Problem: Nicotine use disorder.   ·  Plan: offered nicotine patch , but patient declined it.    Problem/Plan - 4:  ·  Problem: Prophylactic measure.   ·  Plan: Gastrointestinal stress ulcer prophylaxis and DVT prophylaxis administered.

## 2024-03-26 NOTE — DISCHARGE NOTE PROVIDER - PROVIDER TOKENS
PROVIDER:[TOKEN:[[id number]],FOLLOWUP:[1 week]],PROVIDER:[TOKEN:[[id number]],FOLLOWUP:[1 week]]

## 2024-03-26 NOTE — PROGRESS NOTE ADULT - REASON FOR ADMISSION
leg swelling and redness

## 2024-03-26 NOTE — PROGRESS NOTE ADULT - SUBJECTIVE AND OBJECTIVE BOX
PROGRESS NOTE  Patient is a 48y old  Male who presents with a chief complaint of leg swelling and redness (26 Mar 2024 11:41)    Chart and available morning labs /imaging are reviewed electronically , urgent issues addressed . More information  is being added upon completion of rounds , when more information is collected and management discussed with consultants , medical staff and social service/case management on the floor   OVERNIGHT  No new issues reported by medical staff . All above noted Patient is resting in a bed comfortably .Clearance obtained from Dr Hammer and Dr Eisenberg  .No distress noted   Patient ambulates in a room and looking forward for d/c D/c plan and outpatient appointments are discussed   HPI:  48-year-old male with no significant past medical history presents with complaint of redness to bilateral lower legs x 10 days.  Patient states that he has history of folliculitis and noticed rash/?insect bites while in [place] to his bilateral lower legs 10 days ago however states that his symptoms have been getting worse with surrounding redness and occasional weeping.  States that he was seen at urgent care on 8/18 and started on Bactrim without improvement.  States that he returned to urgent care today and was sent to ER for IV antibiotics.  Denies fever, chills, nausea, vomiting, cp, sob or other symptoms.  pcp: Dr. Harrison Scott (22 Mar 2024 16:55)    PAST MEDICAL & SURGICAL HISTORY:  IBS (irritable bowel syndrome)      Nicotine use disorder          MEDICATIONS  (STANDING):  DAPTOmycin IVPB 500 milliGRAM(s) IV Intermittent every 24 hours  diclofenac 75 milliGRAM(s) Oral two times a day  enoxaparin Injectable 40 milliGRAM(s) SubCutaneous every 24 hours  lactobacillus acidophilus 1 Tablet(s) Oral every 12 hours  mupirocin 2% Nasal 1 Application(s) Both Nostrils two times a day  pantoprazole    Tablet 40 milliGRAM(s) Oral before breakfast    MEDICATIONS  (PRN):  acetaminophen     Tablet .. 650 milliGRAM(s) Oral every 6 hours PRN Temp greater or equal to 38C (100.4F), Mild Pain (1 - 3)  aluminum hydroxide/magnesium hydroxide/simethicone Suspension 30 milliLiter(s) Oral every 4 hours PRN Dyspepsia  magnesium hydroxide Suspension 30 milliLiter(s) Oral daily PRN Constipation  melatonin 3 milliGRAM(s) Oral at bedtime PRN Insomnia  ondansetron Injectable 4 milliGRAM(s) IV Push every 8 hours PRN Nausea and/or Vomiting  traMADol 50 milliGRAM(s) Oral three times a day PRN Moderate Pain (4 - 6)      OBJECTIVE    T(C): 36.6 (03-26-24 @ 05:15), Max: 36.9 (03-25-24 @ 21:10)  HR: 55 (03-26-24 @ 05:15) (55 - 64)  BP: 117/66 (03-26-24 @ 05:15) (117/66 - 121/63)  RR: 18 (03-26-24 @ 05:15) (18 - 18)  SpO2: 94% (03-26-24 @ 05:15) (94% - 95%)  Wt(kg): --  I&O's Summary        REVIEW OF SYSTEMS:  CONSTITUTIONAL: No fever, weight loss, or fatigue  EYES: No eye pain, visual disturbances, or discharge  ENMT:   No sinus or throat pain  NECK: No pain or stiffness  RESPIRATORY: No cough, wheezing, chills or hemoptysis; No shortness of breath  CARDIOVASCULAR: No chest pain, palpitations, dizziness, or leg swelling  GASTROINTESTINAL: No abdominal pain. No nausea, vomiting; No diarrhea or constipation. No melena or hematochezia.  GENITOURINARY: No dysuria, frequency, hematuria, or incontinence  NEUROLOGICAL: No headaches, memory loss, loss of strength, numbness, or tremors  SKIN: No itching, burning, rashes, or lesions   MUSCULOSKELETAL: No joint pain or swelling; No muscle, back, or extremity pain  PHYSICAL EXAM:  Appearance: NAD. VS past 24 hrs -as above   HEENT:   Moist oral mucosa. Conjunctiva clear b/l.   Neck : supple  Respiratory: Lungs CTAB.  Gastrointestinal:  Soft, nontender. No rebound. No rigidity. BS present	  Cardiovascular: RRR ,S1S2 present  Skin: No rashes, No ecchymoses, No cyanosis.	  wounds ,skin lesions-See skin assesment flow sheet   LABS:                        13.4   12.48 )-----------( 433      ( 26 Mar 2024 07:49 )             39.4     03-26    141  |  106  |  16  ----------------------------<  112<H>  4.8   |  30  |  0.98    Ca    9.0      26 Mar 2024 07:49    TPro  7.0  /  Alb  3.3  /  TBili  0.4  /  DBili  x   /  AST  25  /  ALT  41  /  AlkPhos  53  03-25    CAPILLARY BLOOD GLUCOSE          Urinalysis Basic - ( 26 Mar 2024 07:49 )    Color: x / Appearance: x / SG: x / pH: x  Gluc: 112 mg/dL / Ketone: x  / Bili: x / Urobili: x   Blood: x / Protein: x / Nitrite: x   Leuk Esterase: x / RBC: x / WBC x   Sq Epi: x / Non Sq Epi: x / Bacteria: x        Culture - Abscess with Gram Stain (collected 23 Mar 2024 12:49)  Source: .Abscess left leg abscess  Gram Stain (24 Mar 2024 00:04):    No polymorphonuclear cells seen per low power field    Rare Gram positive cocci in pairs per oil power field  Preliminary Report (25 Mar 2024 19:42):    Moderate Methicillin Resistant Staphylococcus aureus  Organism: Methicillin resistant Staphylococcus aureus (25 Mar 2024 19:42)  Organism: Methicillin resistant Staphylococcus aureus (25 Mar 2024 19:42)    Culture - Blood (collected 22 Mar 2024 13:20)  Source: .Blood Blood  Preliminary Report (25 Mar 2024 22:01):    No growth at 72 Hours    Culture - Blood (collected 22 Mar 2024 13:20)  Source: .Blood Blood  Preliminary Report (25 Mar 2024 22:01):    No growth at 72 Hours      RADIOLOGY & ADDITIONAL TESTS:   reviewed elctronically  ASSESSMENT/PLAN: 	  Patient was seen and examined on a day of discharge . Plan of care , discharge medications and recommendations discussed with consultants and clearance for discharge obtained .Social service , case management  and medical staff are aware of plan. Family is notified. Discharge summary  is  prepared electronically-see separate document prepared by me .55minutes spent on this visit, 50% visit time spent in care co-ordination with other attendings and counselling patient  I have discussed care plan with patient and HCP,expressed understanding of problems treatment and their effect and side effects, alternatives in detail,I have asked if they have any questions and concerns and appropriately addressed them to best of my ability

## 2024-03-26 NOTE — DISCHARGE NOTE PROVIDER - CARE PROVIDERS DIRECT ADDRESSES
[email or web address]

## 2024-03-26 NOTE — DISCHARGE NOTE PROVIDER - NSDCMRMEDTOKEN_GEN_ALL_CORE_FT
acetaminophen 325 mg oral tablet: 2 tab(s) orally every 6 hours As needed Temp greater or equal to 38C (100.4F), Mild Pain (1 - 3)  diclofenac sodium 75 mg oral delayed release tablet: 1 tab(s) orally 2 times a day  lactobacillus acidophilus oral tablet: 2 tab(s) orally once a day  mupirocin 2% topical cream: Apply topically to affected area 2 times a day  PriLOSEC 20 mg oral delayed release capsule: 1 cap(s) orally  Zyvox 600 mg oral tablet: 1 tab(s) orally 2 times a day   acetaminophen 325 mg oral tablet: 2 tab(s) orally every 6 hours As needed Temp greater or equal to 38C (100.4F), Mild Pain (1 - 3)  diclofenac sodium 75 mg oral delayed release tablet: 1 tab(s) orally 2 times a day  lactobacillus acidophilus oral tablet: 2 tab(s) orally once a day  mupirocin 2% topical cream: Apply topically to affected area 2 times a day to both nares  PriLOSEC 20 mg oral delayed release capsule: 1 cap(s) orally once a day  Zyvox 600 mg oral tablet: 1 tab(s) orally 2 times a day

## 2024-03-26 NOTE — CASE MANAGEMENT PROGRESS NOTE - NSCMPROGRESSNOTE_GEN_ALL_CORE
Per MD, patient is medically cleared for discharge home today.  CM met with patient to discuss discharge disposition. Patient has no skilled needs at this time. Patient with dry dressing, provided with teaching by bedside nurse, and supplies. Discharge notice reviewed, signed, and copy given to patient.  Patient verbalized understanding of the transition plan and is in agreement. Pt stated he would make his own follow up appointments with Dr. Debbie Rodriguez [phone]). CM discussed plan with MD and RN. Patient stated he will drive himself home provider made aware by bedside nurse.  CM remains available throughout hospital stay.

## 2024-03-26 NOTE — PROGRESS NOTE ADULT - SUBJECTIVE AND OBJECTIVE BOX
ELLIOT DAWSON is a 48yMale , patient examined and chart reviewed.    INTERVAL HPI/ OVERNIGHT EVENTS:   Afebrile. Feeling better.  No events.    PAST MEDICAL & SURGICAL HISTORY:  IBS (irritable bowel syndrome)  Nicotine use disorder      For details regarding the patient's social history, family history, and other miscellaneous elements, please refer the initial infectious diseases consultation and/or the admitting history and physical examination for this admission.    ROS:  CONSTITUTIONAL:  Negative fever or chills  EYES:  Negative  blurry vision or double vision  CARDIOVASCULAR:  Negative for chest pain or palpitations  RESPIRATORY:  Negative for cough, wheezing, or SOB   GASTROINTESTINAL:  Negative for nausea, vomiting, diarrhea, constipation, or abdominal pain  GENITOURINARY:  Negative frequency, urgency or dysuria  NEUROLOGIC:  No headache, confusion, dizziness, lightheadedness  All other systems were reviewed and are negative     No Known Allergies      Current inpatient medications :    ANTIBIOTICS/RELEVANT:  DAPTOmycin IVPB 500 milliGRAM(s) IV Intermittent every 24 hours    MEDICATIONS  (STANDING):  diclofenac 75 milliGRAM(s) Oral two times a day  enoxaparin Injectable 40 milliGRAM(s) SubCutaneous every 24 hours  lactobacillus acidophilus 1 Tablet(s) Oral every 12 hours  pantoprazole    Tablet 40 milliGRAM(s) Oral before breakfast    MEDICATIONS  (PRN):  acetaminophen     Tablet .. 650 milliGRAM(s) Oral every 6 hours PRN Temp greater or equal to 38C (100.4F), Mild Pain (1 - 3)  aluminum hydroxide/magnesium hydroxide/simethicone Suspension 30 milliLiter(s) Oral every 4 hours PRN Dyspepsia  magnesium hydroxide Suspension 30 milliLiter(s) Oral daily PRN Constipation  melatonin 3 milliGRAM(s) Oral at bedtime PRN Insomnia  ondansetron Injectable 4 milliGRAM(s) IV Push every 8 hours PRN Nausea and/or Vomiting  traMADol 50 milliGRAM(s) Oral three times a day PRN Moderate Pain (4 - 6)      Objective:  Vital Signs Last 24 Hrs  T(C): 36.6 (26 Mar 2024 05:15), Max: 36.9 (25 Mar 2024 13:51)  T(F): 97.9 (26 Mar 2024 05:15), Max: 98.5 (25 Mar 2024 13:51)  HR: 55 (26 Mar 2024 05:15) (55 - 66)  BP: 117/66 (26 Mar 2024 05:15) (105/52 - 121/63)  RR: 18 (26 Mar 2024 05:15) (17 - 18)  SpO2: 94% (26 Mar 2024 05:15) (94% - 96%)    Parameters below as of 26 Mar 2024 05:15  Patient On (Oxygen Delivery Method): room air      Physical Exam:  General: no acute distress  Neck: supple, trachea midline  Lungs: clear, no wheeze/rhonchi  Cardiovascular: regular rate and rhythm, S1 S2  Abdomen: soft, nontender,  bowel sounds normal  Neurological: alert and oriented x3  Skin: no rash  Extremities: Left Leg abscess improved induration, erythema tenderness      LABS:                         13.4   12.48 )-----------( 433      ( 26 Mar 2024 07:49 )             39.4   03-26    141  |  106  |  16  ----------------------------<  112<H>  4.8   |  30  |  0.98    Ca    9.0      26 Mar 2024 07:49    TPro  7.0  /  Alb  3.3  /  TBili  0.4  /  DBili  x   /  AST  25  /  ALT  41  /  AlkPhos  53  03-25      MICROBIOLOGY:  Culture - Abscess with Gram Stain (03.23.24 @ 12:49)    Gram Stain:   No polymorphonuclear cells seen per low power field  Rare Gram positive cocci in pairs per oil power field   -  Ampicillin/Sulbactam: R <=8/4   -  Cefazolin: R <=4   -  Clindamycin: S <=0.25   -  Daptomycin: S 0.5   -  Erythromycin: R >4   -  Gentamicin: S <=1 Should not be used as monotherapy   -  Linezolid: S 2   -  Oxacillin: R >2   -  Penicillin: R >8   -  Rifampin: S <=1 Should not be used as monotherapy   -  Tetracycline: I 8   -  Trimethoprim/Sulfamethoxazole: S <=0.5/9.5   -  Vancomycin: S 1   Specimen Source: .Abscess left leg abscess   Culture Results:   Moderate Methicillin Resistant Staphylococcus aureus   Organism Identification: Methicillin resistant Staphylococcus aureus   Organism: Methicillin resistant Staphylococcus aureus   Method Type: AIRAM      Culture - Blood (collected 22 Mar 2024 13:20)  Source: .Blood Blood  Preliminary Report (23 Mar 2024 22:01):    No growth at 24 hours    Culture - Blood (collected 22 Mar 2024 13:20)  Source: .Blood Blood  Preliminary Report (23 Mar 2024 22:01):    No growth at 24 hours      RADIOLOGY & ADDITIONAL STUDIES:        Assessment :  49YO M no significant past medical history who presented to the hospital with c/o bilateral LE redness pain and swelling  x 10 days.  Patient states that he has history of folliculitis. Was seen at urgent care on 8/18 and started on Bactrim without improvement. Denies fever, chills, nausea, vomiting, cp, sob or other symptoms. In ED noted to have Primo LE abscess with cellulitis. wbc 18K  Sp I&D in ED.  Abscess Cultures with MRSA  Leukocytosis stable  Clinically better    Plan :   On Dapto   Change to po Zyvox 600mg po bid x 7 days  Bactroban to nares x 7 days  Elevate legs  Pulm toileting   Increase activity  Stable from ID standpoint  Dc home today    D/w Dr Bennett      Continue with present regiment.  Appropriate use of antibiotics and adverse effects reviewed.      I have discussed the above plan of care with patient in detail. He expressed understanding of the  treatment plan . Risks, benefits and alternatives discussed in detail. I have asked if he has any questions or concerns and appropriately addressed them to the best of my ability .    > 35 minutes were spent in direct patient care reviewing notes, medications ,labs data/ imaging , discussion with multidisciplinary team.    Thank you for allowing me to participate in care of your patient .    Dusty Hammer MD  Infectious Disease  [phone]

## 2024-03-26 NOTE — DISCHARGE NOTE NURSING/CASE MANAGEMENT/SOCIAL WORK - NSDCPEFALRISK_GEN_ALL_CORE
For information on Fall & Injury Prevention, visit: [email or web address] OR  [email or web address] OR  https://www.cdc.gov/steadi/patient.html

## 2024-03-27 LAB
CULTURE RESULTS: SIGNIFICANT CHANGE UP
CULTURE RESULTS: SIGNIFICANT CHANGE UP
SPECIMEN SOURCE: SIGNIFICANT CHANGE UP
SPECIMEN SOURCE: SIGNIFICANT CHANGE UP

## 2024-03-28 LAB
CULTURE RESULTS: ABNORMAL
ORGANISM # SPEC MICROSCOPIC CNT: ABNORMAL
ORGANISM # SPEC MICROSCOPIC CNT: ABNORMAL
SPECIMEN SOURCE: SIGNIFICANT CHANGE UP